# Patient Record
Sex: FEMALE | Race: ASIAN | NOT HISPANIC OR LATINO | URBAN - METROPOLITAN AREA
[De-identification: names, ages, dates, MRNs, and addresses within clinical notes are randomized per-mention and may not be internally consistent; named-entity substitution may affect disease eponyms.]

---

## 2019-10-29 ENCOUNTER — INPATIENT (INPATIENT)
Facility: HOSPITAL | Age: 64
LOS: 3 days | Discharge: HOME | End: 2019-11-02
Attending: HOSPITALIST | Admitting: HOSPITALIST
Payer: SELF-PAY

## 2019-10-29 VITALS
RESPIRATION RATE: 18 BRPM | SYSTOLIC BLOOD PRESSURE: 136 MMHG | OXYGEN SATURATION: 100 % | HEART RATE: 105 BPM | TEMPERATURE: 98 F | DIASTOLIC BLOOD PRESSURE: 78 MMHG

## 2019-10-29 RX ORDER — FAMOTIDINE 10 MG/ML
20 INJECTION INTRAVENOUS ONCE
Refills: 0 | Status: COMPLETED | OUTPATIENT
Start: 2019-10-29 | End: 2019-10-29

## 2019-10-29 RX ORDER — PANTOPRAZOLE SODIUM 20 MG/1
80 TABLET, DELAYED RELEASE ORAL ONCE
Refills: 0 | Status: COMPLETED | OUTPATIENT
Start: 2019-10-29 | End: 2019-10-29

## 2019-10-29 RX ORDER — SODIUM CHLORIDE 9 MG/ML
1000 INJECTION INTRAMUSCULAR; INTRAVENOUS; SUBCUTANEOUS ONCE
Refills: 0 | Status: COMPLETED | OUTPATIENT
Start: 2019-10-29 | End: 2019-10-29

## 2019-10-29 NOTE — ED ADULT NURSE NOTE - NSIMPLEMENTINTERV_GEN_ALL_ED
Implemented All Universal Safety Interventions:  [place] to call system. Call bell, personal items and telephone within reach. Instruct patient to call for assistance. Room bathroom lighting operational. Non-slip footwear when patient is off stretcher. Physically safe environment: no spills, clutter or unnecessary equipment. Stretcher in lowest position, wheels locked, appropriate side rails in place.

## 2019-10-29 NOTE — ED ADULT TRIAGE NOTE - CHIEF COMPLAINT QUOTE
bibems from home for nausea and vomiting x 2. EMS states blood was seen in emesis, coffee grounds with large clot.  pt denies anticoagulant use. denies any GI history. 20g iv placed in left ac by ems. bibems from home for nausea and vomiting x 2. also episode of "feeling hot". EMS states blood was seen in emesis, coffee grounds with large clot.  pt denies anticoagulant use. denies any GI history. 20g iv placed in left ac by ems. left bundle branch seen on ekg. pt states has no known cardiac history.

## 2019-10-29 NOTE — ED ADULT NURSE NOTE - CHIEF COMPLAINT QUOTE
bibems from home for nausea and vomiting x 2. also episode of "feeling hot". EMS states blood was seen in emesis, coffee grounds with large clot.  pt denies anticoagulant use. denies any GI history. 20g iv placed in left ac by ems. left bundle branch seen on ekg. pt states has no known cardiac history.

## 2019-10-29 NOTE — ED ADULT NURSE NOTE - OBJECTIVE STATEMENT
Melissa RN: 64yo female received in room C. Pt ambulatory, A&Ox4, family by the bedside. Pt c/o of two episodes of vomiting moderate amount of blood two hours ago, coffee ground in appearance. Pt denies blood thinner use. Pt denies abdominal pain, headache, chest pain, n/v/d, fever, chill, dizziness or weakness. 18G IV placed in right AC, flushed without difficulty and positive blood return. Pt placed on cardiac monitor. Will continue to monitor. Melissa RN: 62yo female received in room C. Pt ambulatory, A&Ox4, family by the bedside. Pt c/o of two episodes of vomiting moderate amount of blood two hours ago, coffee ground in appearance. Pt denies blood thinner use. Pt denies abdominal pain, headache, chest pain, n/v/d, fever, chill, dizziness or weakness at present. 18G IV placed in right AC, flushed without difficulty and positive blood return. Pt placed on cardiac monitor. Will continue to monitor.

## 2019-10-30 DIAGNOSIS — K92.2 GASTROINTESTINAL HEMORRHAGE, UNSPECIFIED: ICD-10-CM

## 2019-10-30 LAB
ALBUMIN SERPL ELPH-MCNC: 4.1 G/DL — SIGNIFICANT CHANGE UP (ref 3.3–5)
ALP SERPL-CCNC: 108 U/L — SIGNIFICANT CHANGE UP (ref 40–120)
ALT FLD-CCNC: 33 U/L — SIGNIFICANT CHANGE UP (ref 4–33)
ANION GAP SERPL CALC-SCNC: 20 MMO/L — HIGH (ref 7–14)
ANISOCYTOSIS BLD QL: SLIGHT — SIGNIFICANT CHANGE UP
APTT BLD: 30 SEC — SIGNIFICANT CHANGE UP (ref 27.5–36.3)
AST SERPL-CCNC: 28 U/L — SIGNIFICANT CHANGE UP (ref 4–32)
BASE EXCESS BLDV CALC-SCNC: -0.5 MMOL/L — SIGNIFICANT CHANGE UP
BASOPHILS # BLD AUTO: 0.01 K/UL — SIGNIFICANT CHANGE UP (ref 0–0.2)
BASOPHILS # BLD AUTO: 0.02 K/UL — SIGNIFICANT CHANGE UP (ref 0–0.2)
BASOPHILS # BLD AUTO: 0.04 K/UL — SIGNIFICANT CHANGE UP (ref 0–0.2)
BASOPHILS NFR BLD AUTO: 0.1 % — SIGNIFICANT CHANGE UP (ref 0–2)
BASOPHILS NFR BLD AUTO: 0.2 % — SIGNIFICANT CHANGE UP (ref 0–2)
BASOPHILS NFR BLD AUTO: 0.3 % — SIGNIFICANT CHANGE UP (ref 0–2)
BASOPHILS NFR SPEC: 0 % — SIGNIFICANT CHANGE UP (ref 0–2)
BILIRUB SERPL-MCNC: 0.2 MG/DL — SIGNIFICANT CHANGE UP (ref 0.2–1.2)
BLASTS # FLD: 0 % — SIGNIFICANT CHANGE UP (ref 0–0)
BLD GP AB SCN SERPL QL: NEGATIVE — SIGNIFICANT CHANGE UP
BLOOD GAS VENOUS - CREATININE: 0.81 MG/DL — SIGNIFICANT CHANGE UP (ref 0.5–1.3)
BLOOD GAS VENOUS - FIO2: 21 — SIGNIFICANT CHANGE UP
BUN SERPL-MCNC: 24 MG/DL — HIGH (ref 7–23)
CALCIUM SERPL-MCNC: 9.4 MG/DL — SIGNIFICANT CHANGE UP (ref 8.4–10.5)
CHLORIDE BLDV-SCNC: 105 MMOL/L — SIGNIFICANT CHANGE UP (ref 96–108)
CHLORIDE SERPL-SCNC: 98 MMOL/L — SIGNIFICANT CHANGE UP (ref 98–107)
CO2 SERPL-SCNC: 23 MMOL/L — SIGNIFICANT CHANGE UP (ref 22–31)
CREAT SERPL-MCNC: 0.97 MG/DL — SIGNIFICANT CHANGE UP (ref 0.5–1.3)
ELLIPTOCYTES BLD QL SMEAR: SLIGHT — SIGNIFICANT CHANGE UP
EOSINOPHIL # BLD AUTO: 0 K/UL — SIGNIFICANT CHANGE UP (ref 0–0.5)
EOSINOPHIL # BLD AUTO: 0.2 K/UL — SIGNIFICANT CHANGE UP (ref 0–0.5)
EOSINOPHIL # BLD AUTO: 0.22 K/UL — SIGNIFICANT CHANGE UP (ref 0–0.5)
EOSINOPHIL NFR BLD AUTO: 0 % — SIGNIFICANT CHANGE UP (ref 0–6)
EOSINOPHIL NFR BLD AUTO: 1.5 % — SIGNIFICANT CHANGE UP (ref 0–6)
EOSINOPHIL NFR BLD AUTO: 2 % — SIGNIFICANT CHANGE UP (ref 0–6)
EOSINOPHIL NFR FLD: 0 % — SIGNIFICANT CHANGE UP (ref 0–6)
GAS PNL BLDV: 139 MMOL/L — SIGNIFICANT CHANGE UP (ref 136–146)
GLUCOSE BLDC GLUCOMTR-MCNC: 182 MG/DL — HIGH (ref 70–99)
GLUCOSE BLDC GLUCOMTR-MCNC: 205 MG/DL — HIGH (ref 70–99)
GLUCOSE BLDC GLUCOMTR-MCNC: 225 MG/DL — HIGH (ref 70–99)
GLUCOSE BLDV-MCNC: 247 MG/DL — HIGH (ref 70–99)
GLUCOSE SERPL-MCNC: 241 MG/DL — HIGH (ref 70–99)
HBA1C BLD-MCNC: 10.8 % — HIGH (ref 4–5.6)
HCO3 BLDV-SCNC: 23 MMOL/L — SIGNIFICANT CHANGE UP (ref 20–27)
HCT VFR BLD CALC: 28.8 % — LOW (ref 34.5–45)
HCT VFR BLD CALC: 33.1 % — LOW (ref 34.5–45)
HCT VFR BLD CALC: 34.1 % — LOW (ref 34.5–45)
HCT VFR BLD CALC: 38.8 % — SIGNIFICANT CHANGE UP (ref 34.5–45)
HCT VFR BLDV CALC: 27.7 % — LOW (ref 34.5–45)
HGB BLD-MCNC: 10.5 G/DL — LOW (ref 11.5–15.5)
HGB BLD-MCNC: 10.7 G/DL — LOW (ref 11.5–15.5)
HGB BLD-MCNC: 11.4 G/DL — LOW (ref 11.5–15.5)
HGB BLD-MCNC: 8.8 G/DL — LOW (ref 11.5–15.5)
HGB BLDV-MCNC: 8.9 G/DL — LOW (ref 11.5–15.5)
IMM GRANULOCYTES NFR BLD AUTO: 0.3 % — SIGNIFICANT CHANGE UP (ref 0–1.5)
IMM GRANULOCYTES NFR BLD AUTO: 0.4 % — SIGNIFICANT CHANGE UP (ref 0–1.5)
IMM GRANULOCYTES NFR BLD AUTO: 0.5 % — SIGNIFICANT CHANGE UP (ref 0–1.5)
INR BLD: 0.93 — SIGNIFICANT CHANGE UP (ref 0.88–1.17)
LACTATE BLDV-MCNC: 4.4 MMOL/L — CRITICAL HIGH (ref 0.5–2)
LYMPHOCYTES # BLD AUTO: 0.7 K/UL — LOW (ref 1–3.3)
LYMPHOCYTES # BLD AUTO: 1.29 K/UL — SIGNIFICANT CHANGE UP (ref 1–3.3)
LYMPHOCYTES # BLD AUTO: 11.4 % — LOW (ref 13–44)
LYMPHOCYTES # BLD AUTO: 2.69 K/UL — SIGNIFICANT CHANGE UP (ref 1–3.3)
LYMPHOCYTES # BLD AUTO: 20 % — SIGNIFICANT CHANGE UP (ref 13–44)
LYMPHOCYTES # BLD AUTO: 5.7 % — LOW (ref 13–44)
LYMPHOCYTES NFR SPEC AUTO: 11.3 % — LOW (ref 13–44)
MAGNESIUM SERPL-MCNC: 1.6 MG/DL — SIGNIFICANT CHANGE UP (ref 1.6–2.6)
MCHC RBC-ENTMCNC: 23.3 PG — LOW (ref 27–34)
MCHC RBC-ENTMCNC: 24.6 PG — LOW (ref 27–34)
MCHC RBC-ENTMCNC: 25.1 PG — LOW (ref 27–34)
MCHC RBC-ENTMCNC: 29.4 % — LOW (ref 32–36)
MCHC RBC-ENTMCNC: 30.8 % — LOW (ref 32–36)
MCHC RBC-ENTMCNC: 32.3 % — SIGNIFICANT CHANGE UP (ref 32–36)
MCV RBC AUTO: 77.5 FL — LOW (ref 80–100)
MCV RBC AUTO: 79.3 FL — LOW (ref 80–100)
MCV RBC AUTO: 80 FL — SIGNIFICANT CHANGE UP (ref 80–100)
METAMYELOCYTES # FLD: 0 % — SIGNIFICANT CHANGE UP (ref 0–1)
MICROCYTES BLD QL: SLIGHT — SIGNIFICANT CHANGE UP
MONOCYTES # BLD AUTO: 0.53 K/UL — SIGNIFICANT CHANGE UP (ref 0–0.9)
MONOCYTES # BLD AUTO: 0.54 K/UL — SIGNIFICANT CHANGE UP (ref 0–0.9)
MONOCYTES # BLD AUTO: 0.81 K/UL — SIGNIFICANT CHANGE UP (ref 0–0.9)
MONOCYTES NFR BLD AUTO: 4.4 % — SIGNIFICANT CHANGE UP (ref 2–14)
MONOCYTES NFR BLD AUTO: 4.7 % — SIGNIFICANT CHANGE UP (ref 2–14)
MONOCYTES NFR BLD AUTO: 6 % — SIGNIFICANT CHANGE UP (ref 2–14)
MONOCYTES NFR BLD: 0.9 % — LOW (ref 2–9)
MYELOCYTES NFR BLD: 0 % — SIGNIFICANT CHANGE UP (ref 0–0)
NEUTROPHIL AB SER-ACNC: 80.9 % — HIGH (ref 43–77)
NEUTROPHILS # BLD AUTO: 11.06 K/UL — HIGH (ref 1.8–7.4)
NEUTROPHILS # BLD AUTO: 9.19 K/UL — HIGH (ref 1.8–7.4)
NEUTROPHILS # BLD AUTO: 9.68 K/UL — HIGH (ref 1.8–7.4)
NEUTROPHILS NFR BLD AUTO: 71.8 % — SIGNIFICANT CHANGE UP (ref 43–77)
NEUTROPHILS NFR BLD AUTO: 81.4 % — HIGH (ref 43–77)
NEUTROPHILS NFR BLD AUTO: 89.3 % — HIGH (ref 43–77)
NEUTS BAND # BLD: 6.9 % — HIGH (ref 0–6)
NRBC # FLD: 0 K/UL — SIGNIFICANT CHANGE UP (ref 0–0)
OTHER - HEMATOLOGY %: 0 — SIGNIFICANT CHANGE UP
PCO2 BLDV: 43 MMHG — SIGNIFICANT CHANGE UP (ref 41–51)
PH BLDV: 7.36 PH — SIGNIFICANT CHANGE UP (ref 7.32–7.43)
PHOSPHATE SERPL-MCNC: 3.6 MG/DL — SIGNIFICANT CHANGE UP (ref 2.5–4.5)
PLATELET # BLD AUTO: 197 K/UL — SIGNIFICANT CHANGE UP (ref 150–400)
PLATELET # BLD AUTO: 211 K/UL — SIGNIFICANT CHANGE UP (ref 150–400)
PLATELET # BLD AUTO: 301 K/UL — SIGNIFICANT CHANGE UP (ref 150–400)
PLATELET COUNT - ESTIMATE: NORMAL — SIGNIFICANT CHANGE UP
PMV BLD: 10.6 FL — SIGNIFICANT CHANGE UP (ref 7–13)
PMV BLD: 10.8 FL — SIGNIFICANT CHANGE UP (ref 7–13)
PMV BLD: 11 FL — SIGNIFICANT CHANGE UP (ref 7–13)
PO2 BLDV: 32 MMHG — LOW (ref 35–40)
POIKILOCYTOSIS BLD QL AUTO: SLIGHT — SIGNIFICANT CHANGE UP
POLYCHROMASIA BLD QL SMEAR: SIGNIFICANT CHANGE UP
POTASSIUM BLDV-SCNC: 3.9 MMOL/L — SIGNIFICANT CHANGE UP (ref 3.4–4.5)
POTASSIUM SERPL-MCNC: 4.3 MMOL/L — SIGNIFICANT CHANGE UP (ref 3.5–5.3)
POTASSIUM SERPL-SCNC: 4.3 MMOL/L — SIGNIFICANT CHANGE UP (ref 3.5–5.3)
PROMYELOCYTES # FLD: 0 % — SIGNIFICANT CHANGE UP (ref 0–0)
PROT SERPL-MCNC: 7.9 G/DL — SIGNIFICANT CHANGE UP (ref 6–8.3)
PROTHROM AB SERPL-ACNC: 10.3 SEC — SIGNIFICANT CHANGE UP (ref 9.8–13.1)
RBC # BLD: 4.26 M/UL — SIGNIFICANT CHANGE UP (ref 3.8–5.2)
RBC # BLD: 4.27 M/UL — SIGNIFICANT CHANGE UP (ref 3.8–5.2)
RBC # BLD: 4.89 M/UL — SIGNIFICANT CHANGE UP (ref 3.8–5.2)
RBC # FLD: 14.6 % — HIGH (ref 10.3–14.5)
RBC # FLD: 14.8 % — HIGH (ref 10.3–14.5)
RBC # FLD: 15 % — HIGH (ref 10.3–14.5)
RH IG SCN BLD-IMP: POSITIVE — SIGNIFICANT CHANGE UP
RH IG SCN BLD-IMP: POSITIVE — SIGNIFICANT CHANGE UP
SAO2 % BLDV: 53.7 % — LOW (ref 60–85)
SODIUM SERPL-SCNC: 141 MMOL/L — SIGNIFICANT CHANGE UP (ref 135–145)
VARIANT LYMPHS # BLD: 0 % — SIGNIFICANT CHANGE UP
WBC # BLD: 11.28 K/UL — HIGH (ref 3.8–10.5)
WBC # BLD: 12.37 K/UL — HIGH (ref 3.8–10.5)
WBC # BLD: 13.47 K/UL — HIGH (ref 3.8–10.5)
WBC # FLD AUTO: 11.28 K/UL — HIGH (ref 3.8–10.5)
WBC # FLD AUTO: 12.37 K/UL — HIGH (ref 3.8–10.5)
WBC # FLD AUTO: 13.47 K/UL — HIGH (ref 3.8–10.5)

## 2019-10-30 PROCEDURE — 99222 1ST HOSP IP/OBS MODERATE 55: CPT | Mod: GC,25

## 2019-10-30 PROCEDURE — 71045 X-RAY EXAM CHEST 1 VIEW: CPT | Mod: 26

## 2019-10-30 PROCEDURE — 99291 CRITICAL CARE FIRST HOUR: CPT

## 2019-10-30 PROCEDURE — 43255 EGD CONTROL BLEEDING ANY: CPT | Mod: GC

## 2019-10-30 PROCEDURE — 93308 TTE F-UP OR LMTD: CPT | Mod: 26,GC

## 2019-10-30 PROCEDURE — 76604 US EXAM CHEST: CPT | Mod: 26,GC

## 2019-10-30 PROCEDURE — 99233 SBSQ HOSP IP/OBS HIGH 50: CPT | Mod: GC,25

## 2019-10-30 RX ORDER — ONDANSETRON 8 MG/1
4 TABLET, FILM COATED ORAL ONCE
Refills: 0 | Status: COMPLETED | OUTPATIENT
Start: 2019-10-30 | End: 2019-10-30

## 2019-10-30 RX ORDER — PANTOPRAZOLE SODIUM 20 MG/1
8 TABLET, DELAYED RELEASE ORAL
Qty: 80 | Refills: 0 | Status: DISCONTINUED | OUTPATIENT
Start: 2019-10-30 | End: 2019-11-01

## 2019-10-30 RX ORDER — CHLORHEXIDINE GLUCONATE 213 G/1000ML
1 SOLUTION TOPICAL
Refills: 0 | Status: DISCONTINUED | OUTPATIENT
Start: 2019-10-30 | End: 2019-10-30

## 2019-10-30 RX ORDER — FENTANYL CITRATE 50 UG/ML
100 INJECTION INTRAVENOUS ONCE
Refills: 0 | Status: DISCONTINUED | OUTPATIENT
Start: 2019-10-30 | End: 2019-10-30

## 2019-10-30 RX ORDER — DEXTROSE 50 % IN WATER 50 %
12.5 SYRINGE (ML) INTRAVENOUS ONCE
Refills: 0 | Status: DISCONTINUED | OUTPATIENT
Start: 2019-10-30 | End: 2019-11-02

## 2019-10-30 RX ORDER — MIDAZOLAM HYDROCHLORIDE 1 MG/ML
4 INJECTION, SOLUTION INTRAMUSCULAR; INTRAVENOUS ONCE
Refills: 0 | Status: DISCONTINUED | OUTPATIENT
Start: 2019-10-30 | End: 2019-10-30

## 2019-10-30 RX ORDER — INSULIN LISPRO 100/ML
VIAL (ML) SUBCUTANEOUS EVERY 6 HOURS
Refills: 0 | Status: DISCONTINUED | OUTPATIENT
Start: 2019-10-30 | End: 2019-10-31

## 2019-10-30 RX ORDER — GLUCAGON INJECTION, SOLUTION 0.5 MG/.1ML
1 INJECTION, SOLUTION SUBCUTANEOUS ONCE
Refills: 0 | Status: DISCONTINUED | OUTPATIENT
Start: 2019-10-30 | End: 2019-11-02

## 2019-10-30 RX ORDER — SODIUM CHLORIDE 9 MG/ML
1000 INJECTION, SOLUTION INTRAVENOUS
Refills: 0 | Status: DISCONTINUED | OUTPATIENT
Start: 2019-10-30 | End: 2019-11-02

## 2019-10-30 RX ORDER — DESMOPRESSIN ACETATE 0.1 MG/1
0.1 TABLET ORAL ONCE
Refills: 0 | Status: COMPLETED | OUTPATIENT
Start: 2019-10-30 | End: 2019-10-30

## 2019-10-30 RX ORDER — DEXTROSE 50 % IN WATER 50 %
15 SYRINGE (ML) INTRAVENOUS ONCE
Refills: 0 | Status: DISCONTINUED | OUTPATIENT
Start: 2019-10-30 | End: 2019-11-02

## 2019-10-30 RX ORDER — ONDANSETRON 8 MG/1
4 TABLET, FILM COATED ORAL EVERY 6 HOURS
Refills: 0 | Status: DISCONTINUED | OUTPATIENT
Start: 2019-10-30 | End: 2019-11-02

## 2019-10-30 RX ORDER — DEXTROSE 50 % IN WATER 50 %
25 SYRINGE (ML) INTRAVENOUS ONCE
Refills: 0 | Status: DISCONTINUED | OUTPATIENT
Start: 2019-10-30 | End: 2019-11-02

## 2019-10-30 RX ADMIN — Medication 2: at 11:15

## 2019-10-30 RX ADMIN — SODIUM CHLORIDE 2000 MILLILITER(S): 9 INJECTION INTRAMUSCULAR; INTRAVENOUS; SUBCUTANEOUS at 00:07

## 2019-10-30 RX ADMIN — SODIUM CHLORIDE 75 MILLILITER(S): 9 INJECTION, SOLUTION INTRAVENOUS at 09:33

## 2019-10-30 RX ADMIN — ONDANSETRON 4 MILLIGRAM(S): 8 TABLET, FILM COATED ORAL at 00:08

## 2019-10-30 RX ADMIN — PANTOPRAZOLE SODIUM 80 MILLIGRAM(S): 20 TABLET, DELAYED RELEASE ORAL at 00:08

## 2019-10-30 RX ADMIN — FENTANYL CITRATE 100 MICROGRAM(S): 50 INJECTION INTRAVENOUS at 10:28

## 2019-10-30 RX ADMIN — PANTOPRAZOLE SODIUM 10 MG/HR: 20 TABLET, DELAYED RELEASE ORAL at 00:56

## 2019-10-30 RX ADMIN — ONDANSETRON 4 MILLIGRAM(S): 8 TABLET, FILM COATED ORAL at 03:10

## 2019-10-30 RX ADMIN — SODIUM CHLORIDE 75 MILLILITER(S): 9 INJECTION, SOLUTION INTRAVENOUS at 23:08

## 2019-10-30 RX ADMIN — PANTOPRAZOLE SODIUM 10 MG/HR: 20 TABLET, DELAYED RELEASE ORAL at 09:33

## 2019-10-30 RX ADMIN — FAMOTIDINE 20 MILLIGRAM(S): 10 INJECTION INTRAVENOUS at 00:08

## 2019-10-30 RX ADMIN — Medication 30 MILLILITER(S): at 00:07

## 2019-10-30 RX ADMIN — MIDAZOLAM HYDROCHLORIDE 4 MILLIGRAM(S): 1 INJECTION, SOLUTION INTRAMUSCULAR; INTRAVENOUS at 10:30

## 2019-10-30 RX ADMIN — Medication 1: at 23:07

## 2019-10-30 RX ADMIN — PANTOPRAZOLE SODIUM 10 MG/HR: 20 TABLET, DELAYED RELEASE ORAL at 23:07

## 2019-10-30 RX ADMIN — Medication 2: at 17:47

## 2019-10-30 RX ADMIN — CHLORHEXIDINE GLUCONATE 1 APPLICATION(S): 213 SOLUTION TOPICAL at 11:15

## 2019-10-30 RX ADMIN — ONDANSETRON 4 MILLIGRAM(S): 8 TABLET, FILM COATED ORAL at 00:41

## 2019-10-30 RX ADMIN — FENTANYL CITRATE 100 MICROGRAM(S): 50 INJECTION INTRAVENOUS at 10:43

## 2019-10-30 NOTE — H&P ADULT - ASSESSMENT
#Neuro    #Resp    #CV    #GI    #ID    #Renal    #Heme    #Endo    #DVT PPx #Neuro  -A&Ox3  -No active issues    #Resp  -No active issues    #CV  -Normotensive, not on any pressors    #GI  -Unclear etiology of upper GI bleed  -Abdomen nontender  -Started on protonix drip  -GI consulted, appreciate recs    #ID  -No active issues    #Renal  -Strict intake/output  -Creatinine 0.97, will continue to trend    #Heme  -Hb 8.8 <- 11.4  -Patient s/p 1 unit pRBC and 1 unit of platelets  -Holding home aspirin while active bleeding  -No easy bruising, vaginal bleeding, or other signs of coagulopathy    #Endo  -Hold home metformin   -Q6H finger sticks with ISS    #DVT PPx  -SCDs 63 year old female with PMH DM, HTN, HLD presented with hematemesis x 1 day.    #Neuro  -A&Ox3  -No active issues    #Resp  -No active issues    #CV  -Normotensive, not on any pressors    #GI  -Unclear etiology of upper GI bleed  -Abdomen nontender  -Started on protonix drip  -GI consulted, appreciate recs    #ID  -No active issues    #Renal  -Strict intake/output  -Creatinine 0.97, will continue to trend    #Heme  -Hb 8.8 <- 11.4  -Patient s/p 1 unit pRBC and 1 unit of platelets  -Holding home aspirin while active bleeding  -No easy bruising, vaginal bleeding, or other signs of coagulopathy    #Endo  -Hold home metformin   -Q6H finger sticks with ISS    #DVT PPx  -SCDs

## 2019-10-30 NOTE — CHART NOTE - NSCHARTNOTEFT_GEN_A_CORE
MICU Transfer Note    Transfer from: MICU  Transfer to:  (X) Medicine    (  ) Telemetry    (  ) RCU    (  ) Palliative    (  ) Stroke Unit    (  ) _______________  Accepting physican: Dr. Nix      MICU COURSE:  63 year old female with PMH DM, HTN, HLD presented with hematemesis x 1 day.  Pt states she started feeling nauseous approximately 2 days ago and had approximately 5 episodes of bright red bloody emesis yesterday.  Denies any history of prior episodes.  Denies any EtOH use.  Last BM yesterday, normal as per pt, denies any bloody stools or black tarry stools. Denies any abdominal pain, diarrhea, chest pain, shortness of breath, dizziness, lightheadedness, syncope, fevers, or rash.  Denies any pain with PO intake.  Denies any recent injury or trauma. Denies any personal or family history of any bleeding disorders.  On admission found to have Hgb drop 11.4->8.8. Pt given 1 unit pRBC and 1 unit of platelets w/ good response. Hgb 10.7. Admitted for UGIB likely 2/2 tory hassan tear vs PUD vs esophagitis. GI consulted. Pt s/p endoscopy.     Pt seen and examined at bedside. Reports that she is feeling weak, denies bleeding. Had BM this AM - unsure if melena or hematochezia. Denies vomiting, F/C, CP, SOB, N/V, swelling.      Vital Signs Last 24 Hrs  T(C): 36.6 (30 Oct 2019 12:00), Max: 37.8 (30 Oct 2019 05:24)  T(F): 97.8 (30 Oct 2019 12:00), Max: 100 (30 Oct 2019 05:24)  HR: 69 (30 Oct 2019 12:00) (69 - 105)  BP: 105/65 (30 Oct 2019 12:00) (105/65 - 174/98)  BP(mean): 77 (30 Oct 2019 12:00) (77 - 119)  RR: 16 (30 Oct 2019 12:00) (13 - 26)  SpO2: 100% (30 Oct 2019 12:00) (95% - 100%)  I&O's Summary    30 Oct 2019 07:01  -  30 Oct 2019 14:18  --------------------------------------------------------  IN: 190 mL / OUT: 400 mL / NET: -210 mL      MEDICATIONS  (STANDING):  dextrose 5%. 1000 milliLiter(s) (50 mL/Hr) IV Continuous <Continuous>  dextrose 50% Injectable 12.5 Gram(s) IV Push once  dextrose 50% Injectable 25 Gram(s) IV Push once  dextrose 50% Injectable 25 Gram(s) IV Push once  insulin lispro (HumaLOG) corrective regimen sliding scale   SubCutaneous every 6 hours  lactated ringers. 1000 milliLiter(s) (75 mL/Hr) IV Continuous <Continuous>  pantoprazole Infusion 8 mG/Hr (10 mL/Hr) IV Continuous <Continuous>    MEDICATIONS  (PRN):  dextrose 40% Gel 15 Gram(s) Oral once PRN Blood Glucose LESS THAN 70 milliGRAM(s)/deciliter  glucagon  Injectable 1 milliGRAM(s) IntraMuscular once PRN Glucose LESS THAN 70 milligrams/deciliter  ondansetron Injectable 4 milliGRAM(s) IV Push every 6 hours PRN Nausea and/or Vomiting    On physical exam:  Gen: NAD [place]  female pt  HEENT: +conjunctival pallor B/L +mucosal pallor  Chest/CV: RRR, +2 peripheral pulses  Pulm: CTAB/L  Abd: soft, NT, ND +BS  MSK: no peripheral edema/cyanosis       LABS                                            10.5                  Neurophils% (auto):   81.4   (10-30 @ 11:00):    11.28)-----------(211          Lymphocytes% (auto):  11.4                                          34.1                   Eosinphils% (auto):   2.0      Manual%: Neutrophils 80.9 ; Lymphocytes 11.3 ; Eosinophils 0.0  ; Bands%: 6.9  ; Blasts 0                                    141    |  98     |  24                  Calcium: 9.4   / iCa: x      (10-29 @ 23:43)    ----------------------------<  241       Magnesium: 1.6                              4.3     |  23     |  0.97             Phosphorous: 3.6      TPro  7.9    /  Alb  4.1    /  TBili  0.2    /  DBili  x      /  AST  28     /  ALT  33     /  AlkPhos  108    29 Oct 2019 23:43    ( 10-29 @ 23:43 )   PT: 10.3 SEC;   INR: 0.93   aPTT: 30.0 SEC      A/P: 64 yo female with hx of DM, HTN, HLD who presents with hematemesis admitted for UGIB likely 2/2 PUD vs esophagitis vs tory hassan tear    Follow up:  F/U GI recs  Monitor H/H  F/u endoscopy results  Keep NPO for now - advance per Gi      BERNARDO Felipe, PGY-3

## 2019-10-30 NOTE — H&P ADULT - NSHPREVIEWOFSYSTEMS_GEN_ALL_CORE
Constitutional: No Fever, Fatigue, Weight Changes  Eyes: No recent vision problems or eye pain.  ENT: No congestion, ear pain, or sore throat.  Endocrine: No excess sweating, temperature intolerance  Cardiovascular: No chest pain, palpitations, shortness of breath, pre-syncope, syncope  Respiratory: No cough, congestion, or wheezing.  Gastrointestinal: abdominal pain, nausea, vomiting  Genitourinary: No dysuria, hematuria  Musculoskeletal: No joint swelling, joint pain  Neurologic: No headache, dizziness, focal weakness  Skin: No rashes, hematoma, prupura

## 2019-10-30 NOTE — CHART NOTE - NSCHARTNOTEFT_GEN_A_CORE
: Karie Del Toro    INDICATION: Shock    PROCEDURE:   LIMITED ECHO   LIMITED CHEST    FINDINGS:  Left ventricle: normal size and function   Right ventricle: normal size and function   Left atrium: dilated   Right atrium: dilated   IVC: normal size    Lungs: predominant A-lines bilaterally   Pleural sliding: normal   Pleural Effusion: no  Consolidation: no    INTERPRETATION:  Normal cardiac function  Normal lung aeration pattern    Images uploaded on [employer]

## 2019-10-30 NOTE — CHART NOTE - NSCHARTNOTEFT_GEN_A_CORE
MICU Transfer Note    Transfer from: MICU  Transfer to:  (  ) Medicine    (  ) Telemetry    (  ) RCU    (  ) Palliative    (  ) Stroke Unit    (  ) _______________  Accepting physican:      MICU COURSE:      63 year old female with PMH DM, HTN, HLD presented with hematemesis x 1 day.  Pt states she started feeling nauseous approximately 2 days ago and had approximately 5 episodes of bright red bloody emesis yesterday.  Denies any history of prior episodes.  Denies any EtOH use.  Last BM yesterday, normal as per pt, denies any bloody stools or black tarry stools. Denies any abdominal pain, diarrhea, chest pain, shortness of breath, dizziness, lightheadedness, syncope, fevers, or rash.  Denies any pain with PO intake.  Denies any recent injury or trauma. Denies any personal or family history of any bleeding disorders.  On admission found to have Hgb drop 11.4->8.8. Pt given 1 unit pRBC and 1 unit of platelets w/ good response. Hgb 10.7. Admitted for UGIB likely 2/2 tory hassan tear vs PUD vs esophagitis. GI consulted. Pt s/p endoscopy.                 Vital Signs Last 24 Hrs  T(C): 36.6 (30 Oct 2019 12:00), Max: 37.8 (30 Oct 2019 05:24)  T(F): 97.8 (30 Oct 2019 12:00), Max: 100 (30 Oct 2019 05:24)  HR: 69 (30 Oct 2019 12:00) (69 - 105)  BP: 105/65 (30 Oct 2019 12:00) (105/65 - 174/98)  BP(mean): 77 (30 Oct 2019 12:00) (77 - 119)  RR: 16 (30 Oct 2019 12:00) (13 - 26)  SpO2: 100% (30 Oct 2019 12:00) (95% - 100%)  I&O's Summary    30 Oct 2019 07:01  -  30 Oct 2019 13:45  --------------------------------------------------------  IN: 190 mL / OUT: 400 mL / NET: -210 mL          MEDICATIONS  (STANDING):  dextrose 5%. 1000 milliLiter(s) (50 mL/Hr) IV Continuous <Continuous>  dextrose 50% Injectable 12.5 Gram(s) IV Push once  dextrose 50% Injectable 25 Gram(s) IV Push once  dextrose 50% Injectable 25 Gram(s) IV Push once  insulin lispro (HumaLOG) corrective regimen sliding scale   SubCutaneous every 6 hours  lactated ringers. 1000 milliLiter(s) (75 mL/Hr) IV Continuous <Continuous>  pantoprazole Infusion 8 mG/Hr (10 mL/Hr) IV Continuous <Continuous>    MEDICATIONS  (PRN):  dextrose 40% Gel 15 Gram(s) Oral once PRN Blood Glucose LESS THAN 70 milliGRAM(s)/deciliter  glucagon  Injectable 1 milliGRAM(s) IntraMuscular once PRN Glucose LESS THAN 70 milligrams/deciliter  ondansetron Injectable 4 milliGRAM(s) IV Push every 6 hours PRN Nausea and/or Vomiting        LABS                                            10.5                  Neurophils% (auto):   81.4   (10-30 @ 11:00):    11.28)-----------(211          Lymphocytes% (auto):  11.4                                          34.1                   Eosinphils% (auto):   2.0      Manual%: Neutrophils 80.9 ; Lymphocytes 11.3 ; Eosinophils 0.0  ; Bands%: 6.9  ; Blasts 0                                    141    |  98     |  24                  Calcium: 9.4   / iCa: x      (10-29 @ 23:43)    ----------------------------<  241       Magnesium: 1.6                              4.3     |  23     |  0.97             Phosphorous: 3.6      TPro  7.9    /  Alb  4.1    /  TBili  0.2    /  DBili  x      /  AST  28     /  ALT  33     /  AlkPhos  108    29 Oct 2019 23:43    ( 10-29 @ 23:43 )   PT: 10.3 SEC;   INR: 0.93   aPTT: 30.0 SEC      ASSESSMENT & PLAN:      62 yo female with hx of DM, HTN, HLD who presents with hematemesis admitted for UGIB likely 2/2 PUD vs esophagitis vs tory hassan tear    #Neuro  -A&Ox3  -No active issues    #Resp  -No active issues  -Saturating well on RA    #CV  -Normotensive, not on any pressors    #GI  -UGIB likely 2/2 PUD vs gastritis vs vasc lesion.  -c/w protonix drip  -GI consulted, s/p endoscopy  -F/u results    DIET  -NPO for now    #ID  -No active issues    #Renal  -sCr wnl  -No active issues     #Heme  Anemia  -Hb 8.8 <- 11.4 on admission. Given 1 unit pRBC and 1 unit of platelets w/ good response. Hgb 10.7  -Holding home aspirin while actively bleeding  -Monitor H/H   -Maintain active type and screen     #Endo  DM  -Holding home metformin   -A1C 10.8  -Q6H finger sticks with ISS when eating       #DVT PPx  -SCDs      Follow ups    F/U GI recs  Monitor H/H  F/u endoscopy results  Keep NPO

## 2019-10-30 NOTE — ED PROVIDER NOTE - OBJECTIVE STATEMENT
62 y/o F w/ PMH of HTN, DM, HLD presenting w/ a complaint of vomiting blood. Pt presents with family. Reports earlier today had 2 episodes of bloody vomit. Had felt well earlier in the day, but after she woke up from a nap she developed nausea and the vomiting. Had felt well prior to symptoms starting. Per EMS report there was coffee ground appearance to emesis. Daughter reports large pieces of blood clots. Pt denies bloody or black colored stools. On daily ASA 81 mg, but denies other daily NSAID use. Currently still nauseous and complaining of abdominal soreness. Denies fevers, chills, headache, dizziness, chest pain, cough, shortness of breath, abdominal pain, diarrhea, constipation, urinary symptoms, MSK pain.

## 2019-10-30 NOTE — ED ADULT NURSE REASSESSMENT NOTE - NS ED NURSE REASSESS COMMENT FT1
Pt had large emesis episode, bloody. Dr. Barkley at bedside, is aware. Protonix infusion and platelet infusion ordered at this time. BP stable 135/64. Pt only endorses belching and abd burning. Will continue to monitor.

## 2019-10-30 NOTE — PROGRESS NOTE ADULT - ASSESSMENT
62 yo female with hx of DM, HTN, HLD who presents with hematemesis admitted for UGIB likely 2/2 PUD vs gastritis vs vasc lesion.    #Neuro  -A&Ox3  -No active issues    #Resp  -No active issues  -Saturating well on RA    #CV  -Normotensive, not on any pressors    #GI  -UGIB likely 2/2 PUD vs gastritis vs vasc lesion.  -c/w protonix drip  -GI consulted, appreciate recs    DIET  -NPO for now    #ID  -No active issues    #Renal  -sCr wnl  -No active issues     #Heme  Anemia  -Hb 8.8 <- 11.4 on admission. Given 1 unit pRBC and 1 unit of platelets w/ good response. Hgb 10.7  -Holding home aspirin while actively bleeding  -Monitor H/H   -Maintain active type and screen     #Endo  DM  -Holding home metformin   -A1C 10.8  -Q6H finger sticks with ISS when eating       #DVT PPx  -SCDs

## 2019-10-30 NOTE — H&P ADULT - NSHPPHYSICALEXAM_GEN_ALL_CORE
VITAL SIGNS (Last 24 hrs):  T(C): 37.1 (10-30-19 @ 02:50), Max: 37.1 (10-30-19 @ 02:50)  HR: 93 (10-30-19 @ 02:50) (91 - 105)  BP: 118/64 (10-30-19 @ 02:50) (115/66 - 136/78)  RR: 20 (10-30-19 @ 02:50) (18 - 20)  SpO2: 99% (10-30-19 @ 02:50) (95% - 100%)  Wt(kg): --  Daily     Daily     I&O's Summary VITAL SIGNS (Last 24 hrs):  T(C): 37.1 (10-30-19 @ 02:50), Max: 37.1 (10-30-19 @ 02:50)  HR: 93 (10-30-19 @ 02:50) (91 - 105)  BP: 118/64 (10-30-19 @ 02:50) (115/66 - 136/78)  RR: 20 (10-30-19 @ 02:50) (18 - 20)  SpO2: 99% (10-30-19 @ 02:50) (95% - 100%)    CONSTITUTIONAL: non-toxic, well appearing  SKIN: no rash, no petechiae.  EYES: PERRL, EOMI, pink conjunctiva, anicteric  ENT: tongue and uvular midline, no exudates, moist mucous membranes  NECK: Supple; no meningismus, no JVD  CARD: RRR, no murmurs, equal radial pulses bilaterally 2+  RESP: CTAB, no respiratory distress  ABD: Soft, non-tender, non-distended, no peritoneal signs, no CVA tenderness  EXT: Normal ROM x4. No edema. No calf tenderness  NEURO: Alert, oriented. Neuro exam nonfocal, equal strength bilaterally  PSYCH: Cooperative, appropriate.

## 2019-10-30 NOTE — H&P ADULT - NSHPLABSRESULTS_GEN_ALL_CORE
8.8    x     )-----------( x        ( 30 Oct 2019 02:50 )             28.8     Hgb Trend: 8.8<--, 11.4<--  10-29    141  |  98  |  24<H>  ----------------------------<  241<H>  4.3   |  23  |  0.97    Ca    9.4      29 Oct 2019 23:43    TPro  7.9  /  Alb  4.1  /  TBili  0.2  /  DBili  x   /  AST  28  /  ALT  33  /  AlkPhos  108  10-29    Creatinine Trend: 0.97<--  PT/INR - ( 29 Oct 2019 23:43 )   PT: 10.3 SEC;   INR: 0.93          PTT - ( 29 Oct 2019 23:43 )  PTT:30.0 SEC

## 2019-10-30 NOTE — ED ADULT NURSE REASSESSMENT NOTE - NS ED NURSE REASSESS COMMENT FT1
Break RN: Pt actively vomiting blood on floor, MD Barkley at bedside. 4mg of zofran IV given per MD Barkley.

## 2019-10-30 NOTE — ED PROVIDER NOTE - CLINICAL SUMMARY MEDICAL DECISION MAKING FREE TEXT BOX
62 y/o F w/ PMH of HTN, DM, HLD presenting w/ bloody emesis. Concern for GI bleeding given hx and symptoms. Unclear etiology at this time. Pt reports normal colonoscopy 5 years ago, but has not had endoscopy. Will check labs. Protonix. Pepcid. Zofran. IVF. Reassess. Likely will require admission for further workup.

## 2019-10-30 NOTE — ED PROVIDER NOTE - PROGRESS NOTE DETAILS
Pt vomited 1/3 emesis bag of dark red blood. HD stable. Protecting airway. Pt consented for platelets to reverse ASA. NGT placed. CHUCK. Patient vomited coffee ground emesis again. pRBCs ordered. Platelets have been given. KULWINDER

## 2019-10-30 NOTE — ED ADULT NURSE REASSESSMENT NOTE - NS ED NURSE REASSESS COMMENT FT1
pt had another large amount of bloody emesis episode. 1 unit PRBC ordered. VS as noted. NSR on cardiac monitor. resp even and unlabored. awaiting MICU bed assignment. will continue to monitor.

## 2019-10-30 NOTE — ED PROVIDER NOTE - ATTENDING CONTRIBUTION TO CARE
63F presents after two episodes of hematemesis described as coffee ground with large red clots. On arrival, pt with epigastric pain, HDS, pending labs, PPI, reassess. Admit for endocscopy for presumed UGIB. Signed out to Dr. Barkley prior to lab results.

## 2019-10-30 NOTE — H&P ADULT - HISTORY OF PRESENT ILLNESS
64 yo woman pmhx of T2DM, HTN, HLD BIBEMS for hematemesis. Patient felt nausea and vomited blood around 8pm. Previously she was in good health at baseline. She denied any previous episodes, hx of etoh use. Recently returned from a trip to [facility].     Meds: Asa 81mg, Metformin 63 year old female with PMH DM, HTN, HLD presented with hematemesis x 1 day.  Pt states she started feeling nauseous approximately 2 days ago and had approximately 5 episodes of bright red bloody emesis yesterday.  Denies any history of prior episodes.  Denies any EtOH use.  Last BM yesterday, normal as per pt, denies any bloody stools or black tarry stools. Denies any abdominal pain, diarrhea, chest pain, shortness of breath, dizziness, lightheadedness, syncope, fevers, or rash.  Denies any pain with PO intake.  Denies any recent injury or trauma. Denies any personal or family history of any bleeding disorders.  Denies any additional complaints.

## 2019-10-30 NOTE — CONSULT NOTE ADULT - ASSESSMENT
Impression:  # Hematemesisi: Ddx: PUD, erosive gastritis, esophagitis, dieulafoy lesion, angioectasia, stomach cancer. Hgb downtrended, vitals now improved after resuscitation  # T2DM  # Hypertension  # hyperlipidemia    Recommendation:  - continue with pantoprazole IV  - trend CBC, CMP, INR  - 2 large bore IVs; active type and screen  - transfuse Hgb > 7, Platelets > 50  - plan for upper endoscopy  - please keep NPO  - supportive care as per primary team

## 2019-10-30 NOTE — PROGRESS NOTE ADULT - ATTENDING COMMENTS
1.Gi bleed due to esophogeal tear seen on EGD. Pt received only 1 unit PRBC . H/H stable. Hemodynamically stable. Monitor H/H

## 2019-10-30 NOTE — ED PROVIDER NOTE - PHYSICAL EXAMINATION
Gen: NAD, AOx3, able to make needs known, non-toxic  Head: NCAT  HEENT: EOMI, oral mucosa moist, normal conjunctiva  Lung: CTAB, no respiratory distress, no wheezes/rhonchi/rales B/L, speaking in full sentences  CV: RRR, no murmurs  Abd: soft, ND, mild epigastric tenderness, no guarding, no CVA tenderness  MSK: no visible deformities  Neuro: No focal sensory or motor deficits  Skin: Warm, well perfused  Psych: normal affect

## 2019-10-30 NOTE — PROGRESS NOTE ADULT - SUBJECTIVE AND OBJECTIVE BOX
Beata Monique MD  PGY 1 Department of Internal Medicine  Pager: [phone]    Patient is a 63y old  Female who presents with a chief complaint of     SUBJECTIVE / OVERNIGHT EVENTS: Pt seen and examined. No acute overnight events.        MEDICATIONS  (STANDING):  chlorhexidine 4% Liquid 1 Application(s) Topical <User Schedule>  pantoprazole Infusion 8 mG/Hr (10 mL/Hr) IV Continuous <Continuous>    MEDICATIONS  (PRN):      I&O's Summary      Vital Signs Last 24 Hrs  T(C): 36.8 (30 Oct 2019 05:24), Max: 37.8 (30 Oct 2019 05:24)  T(F): 100 (30 Oct 2019 05:24), Max: 100 (30 Oct 2019 05:24)  HR: 93 (30 Oct 2019 07:00) (90 - 105)  BP: 142/79 (30 Oct 2019 07:00) (115/66 - 142/79)  BP(mean): 97 (30 Oct 2019 07:00) (94 - 97)  RR: 26 (30 Oct 2019 07:00) (18 - 26)  SpO2: 99% (30 Oct 2019 07:00) (95% - 100%)    CAPILLARY BLOOD GLUCOSE      POCT Blood Glucose.: 175 mg/dL (29 Oct 2019 23:14)                  PHYSICAL EXAM:              CONSTITUTIONAL: non-toxic, well appearing  	SKIN: no rash, no petechiae.  	EYES: PERRL, EOMI, pink conjunctiva, anicteric  	ENT: tongue and uvular midline, no exudates, moist mucous membranes  	NECK: Supple; no meningismus, no JVD  	CARD: RRR, no murmurs, equal radial pulses bilaterally 2+  	RESP: CTAB, no respiratory distress  	ABD: Soft, non-tender, non-distended, no peritoneal signs, no CVA tenderness  	EXT: Normal ROM x4. No edema. No calf tenderness  	NEURO: Alert, oriented. Neuro exam nonfocal, equal strength bilaterally              PSYCH: Cooperative, appropriate.    LABS:                        10.7   12.37 )-----------( 197      ( 30 Oct 2019 07:00 )             33.1     Auto Eosinophil # 0.00  / Auto Eosinophil % 0.0   / Auto Neutrophil # 11.06 / Auto Neutrophil % 89.3  / BANDS % x                            8.8    x     )-----------( x        ( 30 Oct 2019 02:50 )             28.8     Auto Eosinophil # x     / Auto Eosinophil % x     / Auto Neutrophil # x     / Auto Neutrophil % x     / BANDS % x                            11.4   13.47 )-----------( 301      ( 29 Oct 2019 23:43 )             38.8     Auto Eosinophil # 0.20  / Auto Eosinophil % 1.5   / Auto Neutrophil # 9.68  / Auto Neutrophil % 71.8  / BANDS % x        10-29    141  |  98  |  24<H>  ----------------------------<  241<H>  4.3   |  23  |  0.97    Ca    9.4      29 Oct 2019 23:43  Mg     1.6     10-29  Phos  3.6     10-29  TPro  7.9  /  Alb  4.1  /  TBili  0.2  /  DBili  x   /  AST  28  /  ALT  33  /  AlkPhos  108  10-29    PT/INR - ( 29 Oct 2019 23:43 )   PT: 10.3 SEC;   INR: 0.93          PTT - ( 29 Oct 2019 23:43 )  PTT:30.0 SEC              RADIOLOGY & ADDITIONAL TESTS:    Imaging Personally Reviewed:    Consultant(s) Notes Reviewed:      Care Discussed with Consultants/Other Providers:

## 2019-10-30 NOTE — H&P ADULT - ATTENDING COMMENTS
pt seen and examined at bedside as above, Pt is a 64 yo female with hx of dm, htn,  hld, who presents with hematemesis, pt in usoh until few hours pta, had several episodes  of bright red blood emesis, pt on asa for dm,  had hypotension sbp in the 90's.  PE bp 110/70  p103 rr 18 lungs no rhonchi heart s1s2 abdomen distended ext no edema, labs noted hgb 11.6  to 8.8 .  cxr noted,    -ugi bleed etiology pud vs gastritis, vasc lesion  -serial cbc, tx plts, prbcs, give ddavp  -monitor closely in icu  -gi evaluation for endoscopy  -iv protonix  -fs coverage  -critically ill with ugi bleed and hypotension

## 2019-10-30 NOTE — CONSULT NOTE ADULT - SUBJECTIVE AND OBJECTIVE BOX
Chief Complaint:  Patient is a 63y old  Female who presents with a chief complaint of Hematemesis (30 Oct 2019 04:00)      HPI: Pt is a 62 yo female with PMH T2DM, HTN, HLD presented with hematemesis x 1 day. States she had 4 episodes day prior and then 2-3 more episodes overnight. She states initially vomit was clear liquid then became bloody. States it was fank red blood. states prior to vomit was having nausea during day. Never had this prior. Had normal brown BM day prior, no melena or hematochezia. Denies alcohol use or other toxic ingestions. Denies any abdominal pain, diarrhea, chest pain, shortness of breath, dizziness, lightheadedness, syncope, fevers, or rash. No significant NSAID use. Denies any recent injury or trauma. Denies any personal or family history of any bleeding disorders.  Denies any additional complaints.     Allergies:  No Known Allergies      Home Medications:    Hospital Medications:  chlorhexidine 4% Liquid 1 Application(s) Topical <User Schedule>  dextrose 40% Gel 15 Gram(s) Oral once PRN  dextrose 5%. 1000 milliLiter(s) IV Continuous <Continuous>  dextrose 50% Injectable 12.5 Gram(s) IV Push once  dextrose 50% Injectable 25 Gram(s) IV Push once  dextrose 50% Injectable 25 Gram(s) IV Push once  glucagon  Injectable 1 milliGRAM(s) IntraMuscular once PRN  insulin lispro (HumaLOG) corrective regimen sliding scale   SubCutaneous every 6 hours  lactated ringers. 1000 milliLiter(s) IV Continuous <Continuous>  ondansetron Injectable 4 milliGRAM(s) IV Push every 6 hours PRN  pantoprazole Infusion 8 mG/Hr IV Continuous <Continuous>      PMHX/PSHX:  HLD (hyperlipidemia)  HTN (hypertension)  T2DM (type 2 diabetes mellitus)  No significant past surgical history      Family history:  No pertinent family history in first degree relatives      Social History:     ROS:     General:  No wt loss, fevers, chills, night sweats, fatigue,   Eyes:  Good vision, no reported pain  ENT:  No sore throat, pain, runny nose, dysphagia  CV:  No pain, palpitations, hypo/hypertension  Resp:  No dyspnea, cough, tachypnea, wheezing  GI:  See HPI  :  No pain, bleeding, incontinence, nocturia  Muscle:  No pain, weakness  Neuro:  No weakness, tingling, memory problems  Psych:  No fatigue, insomnia, mood problems, depression  Endocrine:  No polyuria, polydipsia, cold/heat intolerance  Heme:  No petechiae, ecchymosis, easy bruisability  Skin:  No rash, edema      PHYSICAL EXAM:     Vital Signs:  Vital Signs Last 24 Hrs  T(C): 37.4 (30 Oct 2019 08:00), Max: 37.8 (30 Oct 2019 05:24)  T(F): 99.4 (30 Oct 2019 08:00), Max: 100 (30 Oct 2019 05:24)  HR: 83 (30 Oct 2019 09:00) (83 - 105)  BP: 143/83 (30 Oct 2019 09:00) (115/66 - 143/83)  BP(mean): 101 (30 Oct 2019 09:00) (89 - 101)  RR: 19 (30 Oct 2019 09:00) (18 - 26)  SpO2: 98% (30 Oct 2019 09:00) (95% - 100%)  Daily Height in cm: 152.4 (30 Oct 2019 05:24)    Daily     GENERAL:  Appears stated age, well-groomed, well-nourished, no distress  HEENT:  NC/AT,  conjunctivae clear and pink, NGT in place with blood in tubing  CHEST:  Full & symmetric excursion, no increased effort, breath sounds clear  HEART:  Regular rhythm, S1, S2, no murmur/rub/S3/S4, no abdominal bruit, no edema, no JVD  ABDOMEN:  Soft, non-tender, non-distended, normoactive bowel sounds,  no masses , no hepatosplenomegaly  EXTREMITIES:  no cyanosis, clubbing or edema  SKIN:  No rash/erythema/ecchymoses/petechiae/wounds/abscess/warm/dry  NEURO:  Alert, oriented    LABS:                        10.7   12.37 )-----------( 197      ( 30 Oct 2019 07:00 )             33.1     10-29    141  |  98  |  24<H>  ----------------------------<  241<H>  4.3   |  23  |  0.97    Ca    9.4      29 Oct 2019 23:43  Phos  3.6     10-29  Mg     1.6     10-29    TPro  7.9  /  Alb  4.1  /  TBili  0.2  /  DBili  x   /  AST  28  /  ALT  33  /  AlkPhos  108  10-29    LIVER FUNCTIONS - ( 29 Oct 2019 23:43 )  Alb: 4.1 g/dL / Pro: 7.9 g/dL / ALK PHOS: 108 u/L / ALT: 33 u/L / AST: 28 u/L / GGT: x           PT/INR - ( 29 Oct 2019 23:43 )   PT: 10.3 SEC;   INR: 0.93          PTT - ( 29 Oct 2019 23:43 )  PTT:30.0 SEC        Imaging:

## 2019-10-31 DIAGNOSIS — I10 ESSENTIAL (PRIMARY) HYPERTENSION: ICD-10-CM

## 2019-10-31 DIAGNOSIS — E78.5 HYPERLIPIDEMIA, UNSPECIFIED: ICD-10-CM

## 2019-10-31 DIAGNOSIS — D50.0 IRON DEFICIENCY ANEMIA SECONDARY TO BLOOD LOSS (CHRONIC): ICD-10-CM

## 2019-10-31 DIAGNOSIS — K92.2 GASTROINTESTINAL HEMORRHAGE, UNSPECIFIED: ICD-10-CM

## 2019-10-31 DIAGNOSIS — I44.7 LEFT BUNDLE-BRANCH BLOCK, UNSPECIFIED: ICD-10-CM

## 2019-10-31 DIAGNOSIS — E11.9 TYPE 2 DIABETES MELLITUS WITHOUT COMPLICATIONS: ICD-10-CM

## 2019-10-31 LAB
ALBUMIN SERPL ELPH-MCNC: 3.3 G/DL — SIGNIFICANT CHANGE UP (ref 3.3–5)
ALP SERPL-CCNC: 73 U/L — SIGNIFICANT CHANGE UP (ref 40–120)
ALT FLD-CCNC: 26 U/L — SIGNIFICANT CHANGE UP (ref 4–33)
ANION GAP SERPL CALC-SCNC: 13 MMO/L — SIGNIFICANT CHANGE UP (ref 7–14)
AST SERPL-CCNC: 24 U/L — SIGNIFICANT CHANGE UP (ref 4–32)
BASOPHILS # BLD AUTO: 0.02 K/UL — SIGNIFICANT CHANGE UP (ref 0–0.2)
BASOPHILS NFR BLD AUTO: 0.2 % — SIGNIFICANT CHANGE UP (ref 0–2)
BILIRUB SERPL-MCNC: 0.3 MG/DL — SIGNIFICANT CHANGE UP (ref 0.2–1.2)
BUN SERPL-MCNC: 14 MG/DL — SIGNIFICANT CHANGE UP (ref 7–23)
CALCIUM SERPL-MCNC: 8.6 MG/DL — SIGNIFICANT CHANGE UP (ref 8.4–10.5)
CHLORIDE SERPL-SCNC: 103 MMOL/L — SIGNIFICANT CHANGE UP (ref 98–107)
CO2 SERPL-SCNC: 24 MMOL/L — SIGNIFICANT CHANGE UP (ref 22–31)
CREAT SERPL-MCNC: 0.82 MG/DL — SIGNIFICANT CHANGE UP (ref 0.5–1.3)
EOSINOPHIL # BLD AUTO: 0.12 K/UL — SIGNIFICANT CHANGE UP (ref 0–0.5)
EOSINOPHIL NFR BLD AUTO: 1.3 % — SIGNIFICANT CHANGE UP (ref 0–6)
GLUCOSE BLDC GLUCOMTR-MCNC: 187 MG/DL — HIGH (ref 70–99)
GLUCOSE BLDC GLUCOMTR-MCNC: 199 MG/DL — HIGH (ref 70–99)
GLUCOSE BLDC GLUCOMTR-MCNC: 254 MG/DL — HIGH (ref 70–99)
GLUCOSE BLDC GLUCOMTR-MCNC: 268 MG/DL — HIGH (ref 70–99)
GLUCOSE BLDC GLUCOMTR-MCNC: 286 MG/DL — HIGH (ref 70–99)
GLUCOSE SERPL-MCNC: 178 MG/DL — HIGH (ref 70–99)
HCT VFR BLD CALC: 29.9 % — LOW (ref 34.5–45)
HCT VFR BLD CALC: 32.8 % — LOW (ref 34.5–45)
HGB BLD-MCNC: 10.4 G/DL — LOW (ref 11.5–15.5)
HGB BLD-MCNC: 9.6 G/DL — LOW (ref 11.5–15.5)
IMM GRANULOCYTES NFR BLD AUTO: 0.2 % — SIGNIFICANT CHANGE UP (ref 0–1.5)
LYMPHOCYTES # BLD AUTO: 2.7 K/UL — SIGNIFICANT CHANGE UP (ref 1–3.3)
LYMPHOCYTES # BLD AUTO: 28.5 % — SIGNIFICANT CHANGE UP (ref 13–44)
MAGNESIUM SERPL-MCNC: 1.7 MG/DL — SIGNIFICANT CHANGE UP (ref 1.6–2.6)
MCHC RBC-ENTMCNC: 25.1 PG — LOW (ref 27–34)
MCHC RBC-ENTMCNC: 25.2 PG — LOW (ref 27–34)
MCHC RBC-ENTMCNC: 31.7 % — LOW (ref 32–36)
MCHC RBC-ENTMCNC: 32.1 % — SIGNIFICANT CHANGE UP (ref 32–36)
MCV RBC AUTO: 78.1 FL — LOW (ref 80–100)
MCV RBC AUTO: 79.4 FL — LOW (ref 80–100)
MONOCYTES # BLD AUTO: 0.8 K/UL — SIGNIFICANT CHANGE UP (ref 0–0.9)
MONOCYTES NFR BLD AUTO: 8.5 % — SIGNIFICANT CHANGE UP (ref 2–14)
NEUTROPHILS # BLD AUTO: 5.8 K/UL — SIGNIFICANT CHANGE UP (ref 1.8–7.4)
NEUTROPHILS NFR BLD AUTO: 61.3 % — SIGNIFICANT CHANGE UP (ref 43–77)
NRBC # FLD: 0 K/UL — SIGNIFICANT CHANGE UP (ref 0–0)
NRBC # FLD: 0 K/UL — SIGNIFICANT CHANGE UP (ref 0–0)
PHOSPHATE SERPL-MCNC: 2.6 MG/DL — SIGNIFICANT CHANGE UP (ref 2.5–4.5)
PLATELET # BLD AUTO: 194 K/UL — SIGNIFICANT CHANGE UP (ref 150–400)
PLATELET # BLD AUTO: 214 K/UL — SIGNIFICANT CHANGE UP (ref 150–400)
PMV BLD: 10.8 FL — SIGNIFICANT CHANGE UP (ref 7–13)
PMV BLD: 10.9 FL — SIGNIFICANT CHANGE UP (ref 7–13)
POTASSIUM SERPL-MCNC: 3.7 MMOL/L — SIGNIFICANT CHANGE UP (ref 3.5–5.3)
POTASSIUM SERPL-SCNC: 3.7 MMOL/L — SIGNIFICANT CHANGE UP (ref 3.5–5.3)
PROT SERPL-MCNC: 6.6 G/DL — SIGNIFICANT CHANGE UP (ref 6–8.3)
RBC # BLD: 3.83 M/UL — SIGNIFICANT CHANGE UP (ref 3.8–5.2)
RBC # BLD: 4.13 M/UL — SIGNIFICANT CHANGE UP (ref 3.8–5.2)
RBC # FLD: 14.6 % — HIGH (ref 10.3–14.5)
RBC # FLD: 14.8 % — HIGH (ref 10.3–14.5)
SODIUM SERPL-SCNC: 140 MMOL/L — SIGNIFICANT CHANGE UP (ref 135–145)
WBC # BLD: 10.33 K/UL — SIGNIFICANT CHANGE UP (ref 3.8–10.5)
WBC # BLD: 9.46 K/UL — SIGNIFICANT CHANGE UP (ref 3.8–10.5)
WBC # FLD AUTO: 10.33 K/UL — SIGNIFICANT CHANGE UP (ref 3.8–10.5)
WBC # FLD AUTO: 9.46 K/UL — SIGNIFICANT CHANGE UP (ref 3.8–10.5)

## 2019-10-31 PROCEDURE — 99232 SBSQ HOSP IP/OBS MODERATE 35: CPT | Mod: GC

## 2019-10-31 PROCEDURE — 99233 SBSQ HOSP IP/OBS HIGH 50: CPT | Mod: GC

## 2019-10-31 RX ORDER — INSULIN LISPRO 100/ML
VIAL (ML) SUBCUTANEOUS
Refills: 0 | Status: DISCONTINUED | OUTPATIENT
Start: 2019-10-31 | End: 2019-11-02

## 2019-10-31 RX ORDER — INSULIN LISPRO 100/ML
VIAL (ML) SUBCUTANEOUS AT BEDTIME
Refills: 0 | Status: DISCONTINUED | OUTPATIENT
Start: 2019-10-31 | End: 2019-11-02

## 2019-10-31 RX ORDER — ATORVASTATIN CALCIUM 80 MG/1
40 TABLET, FILM COATED ORAL AT BEDTIME
Refills: 0 | Status: DISCONTINUED | OUTPATIENT
Start: 2019-10-31 | End: 2019-11-02

## 2019-10-31 RX ORDER — INSULIN GLARGINE 100 [IU]/ML
13 INJECTION, SOLUTION SUBCUTANEOUS AT BEDTIME
Refills: 0 | Status: DISCONTINUED | OUTPATIENT
Start: 2019-10-31 | End: 2019-11-01

## 2019-10-31 RX ADMIN — Medication 3: at 12:18

## 2019-10-31 RX ADMIN — PANTOPRAZOLE SODIUM 10 MG/HR: 20 TABLET, DELAYED RELEASE ORAL at 11:26

## 2019-10-31 RX ADMIN — ATORVASTATIN CALCIUM 40 MILLIGRAM(S): 80 TABLET, FILM COATED ORAL at 22:35

## 2019-10-31 RX ADMIN — INSULIN GLARGINE 13 UNIT(S): 100 INJECTION, SOLUTION SUBCUTANEOUS at 22:35

## 2019-10-31 RX ADMIN — SODIUM CHLORIDE 75 MILLILITER(S): 9 INJECTION, SOLUTION INTRAVENOUS at 22:34

## 2019-10-31 RX ADMIN — SODIUM CHLORIDE 75 MILLILITER(S): 9 INJECTION, SOLUTION INTRAVENOUS at 11:25

## 2019-10-31 RX ADMIN — Medication 3: at 18:22

## 2019-10-31 RX ADMIN — Medication 1: at 05:38

## 2019-10-31 RX ADMIN — Medication 1: at 22:35

## 2019-10-31 RX ADMIN — PANTOPRAZOLE SODIUM 10 MG/HR: 20 TABLET, DELAYED RELEASE ORAL at 22:34

## 2019-10-31 NOTE — DISCHARGE NOTE PROVIDER - NSDCFUADDINST_GEN_ALL_CORE_FT
Please follow up with your primary care doctor in 1-3 days.    Please follow up with any specialists you have within 1 week. We had held two of your 3 blood pressure medications because your blood pressures have been on the normal end. Please follow with your primary care doctor within 1 week of discharge to discuss your hospital course and to discuss restarting your blood pressure medications.     Please follow up with the stomach doctors within 2 weeks, please take the pantoprazole twice a day as instructed.     We had adjusted your insulin dose as your blood sugars have been well controlled on lower doses. Please record your blood sugars three times a day with meals and bring the log with you to your primary care doctor within 1 week of discharge.

## 2019-10-31 NOTE — MEDICAL STUDENT PROGRESS NOTE(EDUCATION) - NS MD HP STUD ASPLAN PLAN FT
1. Hematemesis: likely 2/2 to Joanna-Mcbride tear, possibly induced by multiple episodes of emesis. Pt denies any history of EtOH intake, GI problems, or similar episodes in the past. Ddx also includes gastritis, PUD, vascular lesion.  - Pt is s/p upper endoscopy which showed Joanna-Mcbride tear with adherent clot at GE junction (clips were placed), moderately severe esophagitis, and gastritis.  - C/w protonix drip  - Appreciate GI recs    2. Anemia: Hb this AM was 9.6, down from 10.7 yesterday (however all cell lines are decreased therefore there may be a dilutional component). Concerning for active bleed.  - Pt is s/p 1u of pRBCs and 1u platelets 2/2 to drop in Hb on admission (11.4 --> 8.8)  - Add on retic count to assess for response 1. Hematemesis: ddx includes Joanna-Mcbride tear 2/2 multiple bouts of emesis, vs. esophagitis causing UGI bleed. Less likely causes include gastritis, PUD, and vascular lesion. Pt denies any history of EtOH intake, GI problems, or similar episodes in the past. Pt takes ASA 81mg QD at home, and Aleve for back pain 1-2x/wk for the past year. She reports symptoms of reflux including substernal/epigastric pain after meals, however denies use of Protonix/PPIs. Esophagitis may be reflux-induced; less likely eosinophilic (no history of asthma), or pill-induced (moderate use of NSAIDs).  - Pt is s/p upper endoscopy which showed Joanna-Mcbride tear with adherent clot at GE junction (clips were placed), moderately severe esophagitis, and gastritis.  - C/w protonix drip  - Hold home ASA in setting of active bleed  - Appreciate GI recs    2. Anemia: Hb this AM was 9.6, down from 10.7 yesterday (however all cell lines are decreased therefore there may be a dilutional component). Concerning for active bleed.  - Pt is s/p 1u of pRBCs and 1u platelets 2/2 to drop in Hb on admission (11.4 --> 8.8)  - Add on retic count to assess for response  - Transfuse if Hb <7  - CBC daily  - Active type and screen    3. Diabetes mellitus, type 2: patient's last A1c was 10.8 (10/29). Reports that she takes metformin, glipizide at home.  - Hold home meds  - ISS pre-meal  - Confirm home meds with pharmacy    4. Hyperlipidemia: patient reports that she takes Lipitor, unclear dose  - Med rec    5. Hypertension: pt's BP this AM was 137/63 1. Hematemesis: ddx includes Joanna-Mcbride tear 2/2 multiple bouts of emesis, vs. esophagitis causing UGI bleed. Less likely causes include gastritis, PUD, and vascular lesion. Pt denies any history of EtOH intake, GI problems, or similar episodes in the past. Pt takes ASA 81mg QD at home, and Aleve for back pain 1-2x/wk for the past year. She reports symptoms of reflux including substernal/epigastric pain after meals, however denies use of Protonix/PPIs. Esophagitis may be reflux-induced; less likely eosinophilic (no history of asthma), or pill-induced (moderate use of NSAIDs).  - Pt is s/p upper endoscopy which showed Joanna-Mcbride tear with adherent clot at GE junction (clips were placed), moderately severe esophagitis, and gastritis.  - C/w pantoprazole 40mg IV BID  - Hold home ASA in setting of active bleed  - Advance diet starting with clears, continue as tolerated  - Appreciate GI recs    2. Anemia: Hb this AM was 9.6, down from 10.7 yesterday (however all cell lines are decreased therefore there may be a dilutional component). Less concerning for active bleed.  - Pt is s/p 1u of pRBCs and 1u platelets 2/2 to drop in Hb on admission (11.4 --> 8.8)  - Add on retic count to assess for response  - Transfuse if Hb <7  - CBC daily  - Active type and screen    3. Diabetes mellitus, type 2: patient's last A1c was 10.8 (10/29). Reports that she takes metformin, glipizide at home.  - Hold home meds  - ISS pre-meal  - Confirm home meds with pharmacy    4. Hyperlipidemia: patient reports that she takes Lipitor, unclear dose  - Awaiting med rec    5. Hypertension: pt's BP this AM was 137/63  - Awaiting med rec

## 2019-10-31 NOTE — MEDICAL STUDENT PROGRESS NOTE(EDUCATION) - NS MD HP STUD ASPLAN ASSES FT
The patient is a 64 yo F with HTN, HLD, DM2 presenting with hematemesis x1d, found on imaging to have Joanna-Mcbride tear, moderately severe esophagitis, and gastritis. Labs were significant for anemia (Hb 9.6) which continues to worsen suggesting active bleed. The patient is a 64 yo F with HTN, HLD, DM2 presenting with hematemesis x1d, found on imaging to have Joanna-Mcbride tear, moderately severe esophagitis, and gastritis. Labs were significant for anemia (Hb 9.6) suggesting active bleed.

## 2019-10-31 NOTE — MEDICAL STUDENT PROGRESS NOTE(EDUCATION) - SUBJECTIVE AND OBJECTIVE BOX
Chief Complaint:  Patient is a 63y old  Female who presents with a chief complaint of Hematemesis Chief Complaint:  Patient is a 63y old  Female who presents with a chief complaint of Hematemesis     Overnight: no acute events. Patient has had no nausea, vomiting, or bowel movements. She denies headache, lightheadedness, dizziness, or heart palpitations.    Pt's daughter passed away last week; she returned on  after attending the  in [facility]. She is currently living with her other daughter.    REVIEW OF SYSTEMS:    CONSTITUTIONAL: No weakness, fevers or chills  EYES/ENT: No visual changes;  No vertigo or throat pain   NECK: No pain or stiffness  RESPIRATORY: No cough, wheezing, hemoptysis; No shortness of breath  CARDIOVASCULAR: No chest pain or palpitations  GASTROINTESTINAL: No abdominal or epigastric pain. No nausea, vomiting, or hematemesis; No diarrhea or constipation. No melena or hematochezia.  GENITOURINARY: No dysuria, frequency or hematuria  NEUROLOGICAL: No numbness or weakness  SKIN: No itching, rashes    Vital Signs (24 Hrs):  T(C): 36.8 (10-31-19 @ 05:36), Max: 37.4 (10-30-19 @ 08:00)  HR: 72 (10-31-19 @ 05:36) (66 - 88)  BP: 137/63 (10-31-19 @ 05:36) (105/65 - 174/98)  RR: 16 (10-31-19 @ 05:36) (13 - 25)  SpO2: 97% (10-31-19 @ 05:36) (97% - 100%)  Wt(kg): --  Daily     Daily     I&O's Summary    30 Oct 2019 07:01  -  31 Oct 2019 07:00  --------------------------------------------------------  IN: 360 mL / OUT: 400 mL / NET: -40 mL    PHYSICAL EXAM:    General: WN/WD NAD  Neurology: A&Ox3  Respiratory: CTA B/L  CV: RRR, S1S2, no murmurs, rubs or gallops  Abdominal: Soft, NT, ND +BS, Last BM 10/29 overnight (pt is NPO currently)  Extremities: No edema, + peripheral pulses      LABS:              Hb 9.6 (<--10.7 <--- 11.4)               9.6    9.46  )-----------( 194      ( 31 Oct 2019 05:17 )             29.9       140  |  103  |  14  ----------------------------<  178<H>  3.7   |  24  |  0.82    Ca    8.6      31 Oct 2019 05:17  Phos  2.6     10-31  Mg     1.7     10-31    TPro  6.6  /  Alb  3.3  /  TBili  0.3  /  DBili  x   /  AST  24  /  ALT  26  /  AlkPhos  73  10-31    PT/INR - ( 29 Oct 2019 23:43 )   PT: 10.3 SEC;   INR: 0.93          PTT - ( 29 Oct 2019 23:43 )  PTT:30.0 SEC      IMAGING    Upper Endoscopy (10/30)  Impression:          - Joanna-Mcbride tear with adherent clot seen at the                        lower esophagus (GEJ). Clips (MR conditional) were                        placed.                       - Joanna-Mcbride tear.                       - Moderately severe esophagitis.                       - Gastritis.                       - Normal examined duodenum.                       - No specimens collected.

## 2019-10-31 NOTE — PROGRESS NOTE ADULT - SUBJECTIVE AND OBJECTIVE BOX
Chief Complaint:  Patient is a 63y old  Female who presents with a chief complaint of Hematemesis (31 Oct 2019 07:45)      Interval Events: No more nausea. Denies melena, hematochezia, hematemesis, vomiting. Pt not yet have a BM since procedure.    Allergies:  No Known Allergies      Hospital Medications:  dextrose 40% Gel 15 Gram(s) Oral once PRN  dextrose 5%. 1000 milliLiter(s) IV Continuous <Continuous>  dextrose 50% Injectable 12.5 Gram(s) IV Push once  dextrose 50% Injectable 25 Gram(s) IV Push once  dextrose 50% Injectable 25 Gram(s) IV Push once  glucagon  Injectable 1 milliGRAM(s) IntraMuscular once PRN  insulin lispro (HumaLOG) corrective regimen sliding scale   SubCutaneous every 6 hours  lactated ringers. 1000 milliLiter(s) IV Continuous <Continuous>  ondansetron Injectable 4 milliGRAM(s) IV Push every 6 hours PRN  pantoprazole Infusion 8 mG/Hr IV Continuous <Continuous>      PMHX/PSHX:  HLD (hyperlipidemia)  HTN (hypertension)  T2DM (type 2 diabetes mellitus)  No significant past surgical history      Family history:  No pertinent family history in first degree relatives      ROS:     General:  No wt loss, fevers, chills, night sweats, fatigue,   Eyes:  Good vision, no reported pain  ENT:  No sore throat, pain, runny nose, dysphagia  CV:  No pain, palpitations, hypo/hypertension  Resp:  No dyspnea, cough, tachypnea, wheezing  GI:  See HPI  :  No pain, bleeding, incontinence, nocturia  Muscle:  No pain, weakness  Neuro:  No weakness, tingling, memory problems  Psych:  No fatigue, insomnia, mood problems, depression  Endocrine:  No polyuria, polydipsia, cold/heat intolerance  Heme:  No petechiae, ecchymosis, easy bruisability  Skin:  No rash, edema      PHYSICAL EXAM:     Vital Signs:  Vital Signs Last 24 Hrs  T(C): 36.8 (31 Oct 2019 05:36), Max: 36.9 (30 Oct 2019 15:37)  T(F): 98.2 (31 Oct 2019 05:36), Max: 98.5 (30 Oct 2019 15:37)  HR: 72 (31 Oct 2019 05:36) (66 - 88)  BP: 137/63 (31 Oct 2019 05:36) (105/65 - 174/98)  BP(mean): 84 (30 Oct 2019 15:00) (76 - 119)  RR: 16 (31 Oct 2019 05:36) (13 - 25)  SpO2: 97% (31 Oct 2019 05:36) (97% - 100%)  Daily     Daily     GENERAL:  appears comfortable, no acute distress  HEENT:  NC/AT,  conjunctivae clear, sclera -anicteric  CHEST:  CTABL, no increased effort  HEART:  Regular rhythm, S1, S2, no murmur/rub/S3/S4  ABDOMEN:  Soft, non-tender, non-distended, normoactive bowel sounds,  no masses ,no hepato-splenomegaly,   EXTREMITIES:  no cyanosis, clubbing or edema  SKIN:  No rash/erythema/ecchymoses/petechiae/wounds  NEURO:  Alert, oriented    LABS:                        9.6    9.46  )-----------( 194      ( 31 Oct 2019 05:17 )             29.9     10-31    140  |  103  |  14  ----------------------------<  178<H>  3.7   |  24  |  0.82    Ca    8.6      31 Oct 2019 05:17  Phos  2.6     10-31  Mg     1.7     10-31    TPro  6.6  /  Alb  3.3  /  TBili  0.3  /  DBili  x   /  AST  24  /  ALT  26  /  AlkPhos  73  10-31    LIVER FUNCTIONS - ( 31 Oct 2019 05:17 )  Alb: 3.3 g/dL / Pro: 6.6 g/dL / ALK PHOS: 73 u/L / ALT: 26 u/L / AST: 24 u/L / GGT: x           PT/INR - ( 29 Oct 2019 23:43 )   PT: 10.3 SEC;   INR: 0.93          PTT - ( 29 Oct 2019 23:43 )  PTT:30.0 SEC        Imaging:      < from: Upper Endoscopy (10.30.19 @ 13:16) >  Findings:       A 10 mm non-bleeding Joanna-Mcbride tear with stigmata of recent bleeding        (adherent clot) was found at lower esophagus (GEJ). For hemostasis, two        hemostatic clips were successfully placed (MR conditional). There was no        bleeding at the end of the procedure.       A 7 mmnon-bleeding Joanna-Mcbride tear with no stigmata of recent        bleeding was found on the opposite wall of the lower esophagus. No        intervention performed.       Moderately severe esophagitis with no bleeding was found.       Patchy mild inflammation characterized by congestion (edema) and        erythema was found in the stomach.       The examined duodenum was normal.                                                                                   Impression:          - Joanna-Mcbride tear with adherent clot seen at the                        lower esophagus (GEJ). Clips (MR conditional) were                        placed.                       - Joanna-Mcbride tear.                       - Moderately severe esophagitis.           - Gastritis.                       - Normal examined duodenum.                       - No specimens collected.  Recommendation:      - Continue ongoing care in MICU.                       - Use a proton pump inhibitor IV BID.        - Clear liquid diet.    < end of copied text >

## 2019-10-31 NOTE — DISCHARGE NOTE PROVIDER - NSDCMRMEDTOKEN_GEN_ALL_CORE_FT
amLODIPine 10 mg oral tablet: 1 tab(s) orally once a day  atenolol-chlorthalidone 50 mg-25 mg oral tablet: 1 tab(s) orally once a day  atorvastatin 40 mg oral tablet: 1 tab(s) orally once a day  Basaglar KwikPen 100 units/mL subcutaneous solution: 50 unit(s) subcutaneous once a day (at bedtime)  glipiZIDE 10 mg oral tablet: 1 tab(s) orally once a day  Januvia 100 mg oral tablet: 1 tab(s) orally once a day  losartan 100 mg oral tablet: 1 tab(s) orally once a day  metFORMIN 1000 mg oral tablet: 1 tab(s) orally 2 times a day amLODIPine 10 mg oral tablet: 1 tab(s) orally once a day  atenolol-chlorthalidone 50 mg-25 mg oral tablet: 1 tab(s) orally once a day  atorvastatin 40 mg oral tablet: 1 tab(s) orally once a day  Basaglar KwikPen 100 units/mL subcutaneous solution: 50 unit(s) subcutaneous once a day (at bedtime)  glipiZIDE 10 mg oral tablet: 1 tab(s) orally once a day  Januvia 100 mg oral tablet: 1 tab(s) orally once a day  losartan 100 mg oral tablet: 1 tab(s) orally once a day  metFORMIN 1000 mg oral tablet: 1 tab(s) orally 2 times a day  pantoprazole 40 mg oral delayed release tablet: 1 tab(s) orally 2 times a day atenolol-chlorthalidone 50 mg-25 mg oral tablet: 1 tab(s) orally once a day  atorvastatin 40 mg oral tablet: 1 tab(s) orally once a day  Basaglar KwikPen 100 units/mL subcutaneous solution: 25 unit(s) subcutaneous once a day (at bedtime)   glipiZIDE 10 mg oral tablet: 1 tab(s) orally once a day  Januvia 100 mg oral tablet: 1 tab(s) orally once a day  metFORMIN 1000 mg oral tablet: 1 tab(s) orally 2 times a day  pantoprazole 40 mg oral delayed release tablet: 1 tab(s) orally 2 times a day atenolol-chlorthalidone 50 mg-25 mg oral tablet: 1 tab(s) orally once a day  atorvastatin 40 mg oral tablet: 1 tab(s) orally once a day  glipiZIDE 10 mg oral tablet: 1 tab(s) orally once a day  Januvia 100 mg oral tablet: 1 tab(s) orally once a day  metFORMIN 1000 mg oral tablet: 1 tab(s) orally 2 times a day  pantoprazole 40 mg oral delayed release tablet: 1 tab(s) orally 2 times a day atorvastatin 40 mg oral tablet: 1 tab(s) orally once a day  glipiZIDE 10 mg oral tablet: 1 tab(s) orally once a day  insulin glargine: 20 unit(s) subcutaneous once a day (at bedtime)  Januvia 100 mg oral tablet: 1 tab(s) orally once a day  metFORMIN 1000 mg oral tablet: 1 tab(s) orally 2 times a day  pantoprazole 40 mg oral delayed release tablet: 1 tab(s) orally 2 times a day amLODIPine 10 mg oral tablet: 1 tab(s) orally once a day  atorvastatin 40 mg oral tablet: 1 tab(s) orally once a day  glipiZIDE 10 mg oral tablet: 1 tab(s) orally once a day  insulin glargine: 20 unit(s) subcutaneous once a day (at bedtime)  Januvia 100 mg oral tablet: 1 tab(s) orally once a day  metFORMIN 1000 mg oral tablet: 1 tab(s) orally 2 times a day  pantoprazole 40 mg oral delayed release tablet: 1 tab(s) orally 2 times a day amLODIPine 10 mg oral tablet: 1 tab(s) orally once a day  atorvastatin 40 mg oral tablet: 1 tab(s) orally once a day  Basaglar KwikPen 100 units/mL subcutaneous solution: 20 unit(s) subcutaneous once a day (at bedtime)   glipiZIDE 10 mg oral tablet: 1 tab(s) orally once a day  Januvia 100 mg oral tablet: 1 tab(s) orally once a day  metFORMIN 1000 mg oral tablet: 1 tab(s) orally 2 times a day  pantoprazole 40 mg oral delayed release tablet: 1 tab(s) orally 2 times a day

## 2019-10-31 NOTE — PROGRESS NOTE ADULT - PROBLEM SELECTOR PLAN 4
-awaiting med rec -home amlodipine 10, atenolol/chlor 50/25, losartan 100 -held home metformin, januvia, glipizide  -home glargine 50U bedtime  -ISS -held home metformin, januvia, glipizide  -home glargine 50U bedtime, will give 13U and titrate up as needed  -ISS

## 2019-10-31 NOTE — PROGRESS NOTE ADULT - PROBLEM SELECTOR PROBLEM 4
Hypertension, unspecified type Type 2 diabetes mellitus without complication, unspecified whether long term insulin use

## 2019-10-31 NOTE — PROGRESS NOTE ADULT - PROBLEM SELECTOR PROBLEM 3
Type 2 diabetes mellitus without complication, unspecified whether long term insulin use Bundle branch block, left

## 2019-10-31 NOTE — PROGRESS NOTE ADULT - PROBLEM SELECTOR PLAN 1
-Pt admitted for hematemesis  -UGIB 2/2 PUD vs gastritis vs vascular lesion vs erosive gastritis vs esophagitis vs dieulafoy lesion vs angiectasia  -GI consult: plan for upper scope, pt NPO  -PPI, zofran -Pt admitted for hematemesis, not actively bleeding  -UGIB 2/2 Joanna-Mcbride tear, esophagitis per scope  -GI consult: can advance diet to clears  -PPI, zofran

## 2019-10-31 NOTE — DISCHARGE NOTE PROVIDER - CARE PROVIDER_API CALL
Sofiya Cunha  [address] [place] [address]  Phone: [phone]  Fax: (   )    -  Follow Up Time: Sofiya Cunha  [address] [place] [address]  Phone: [phone]  Fax: (   )    -  Follow Up Time:     Tracey Ortiz)  Gastroenterology; Internal Medicine  [address] [place] [address]  Phone: [phone]  Fax: [phone]  Follow Up Time:

## 2019-10-31 NOTE — DISCHARGE NOTE PROVIDER - NSFOLLOWUPCLINICS_GEN_ALL_ED_FT
[facility]astroenter[facility]  Gastroenterology  [address] [place] [address]  Phone: [phone]  Fax:   Follow Up Time:

## 2019-10-31 NOTE — PROGRESS NOTE ADULT - PROBLEM SELECTOR PLAN 3
-held home metformin  -ISS -held home metformin, januvia, glipizide  -home glargine 50U bedtime  -ISS -LBBB found on ECG, no previous test to compare to  -will reach out to PMD for further info  -TTE ordered

## 2019-10-31 NOTE — DISCHARGE NOTE PROVIDER - HOSPITAL COURSE
63 year old female with PMH DM, HTN, HLD presented with hematemesis x 1 day.  Pt states she started feeling nauseous approximately 2 days ago and had approximately 5 episodes of bright red bloody emesis yesterday.  Denies any history of prior episodes.  Denies any EtOH use.  Last BM yesterday, normal as per pt, denies any bloody stools or black tarry stools. Denies any abdominal pain, diarrhea, chest pain, shortness of breath, dizziness, lightheadedness, syncope, fevers, or rash.  Denies any pain with PO intake.  Denies any recent injury or trauma. Denies any personal or family history of any bleeding disorders.  Denies any additional complaints.         Studies Done:        Upper Endpscopy:        Impression:          - Joanna-Mcbride tear with adherent clot seen at the                          lower esophagus (GEJ). Clips (MR conditional) were                          placed.                         - Joanna-Mcbride tear.                         - Moderately severe esophagitis.                         - Gastritis.                         - Normal examined duodenum.                         - No specimens collected.        Pt placed on PPI 63 year old female with PMH DM, HTN, HLD presented with hematemesis x 1 day.  Pt states she started feeling nauseous approximately 2 days ago and had approximately 5 episodes of bright red bloody emesis yesterday.  Denies any history of prior episodes.  Denies any EtOH use.  Last BM yesterday, normal as per pt, denies any bloody stools or black tarry stools. Denies any abdominal pain, diarrhea, chest pain, shortness of breath, dizziness, lightheadedness, syncope, fevers, or rash.  Denies any pain with PO intake.  Denies any recent injury or trauma. Denies any personal or family history of any bleeding disorders.  Denies any additional complaints.         On admission found to have Hgb drop 11.4->8.8. Pt taken to MICU and given 1 unit pRBC and 1 unit of platelets w/ good response. Hgb 10.7. GI was consulted and a scope was done.        Studies Done:        Upper Endpscopy:        Impression:  - Joanna-Mcbride tear with adherent clot seen at the                          lower esophagus (GEJ). Clips (MR conditional) were                          placed.                         - Joanna-Mcbride tear.                         - Moderately severe esophagitis.                         - Gastritis.                         - Normal examined duodenum.                         - No specimens collected.        A clip was placed; Pt placed on PPI IV then switched to po bid (to be given a 2 week course on d/c). Her diet was slowly advanced. Her Hb stabilized at 10.2.        The patient was seen and evaluated and deemed medically stable for discharge. 63 year old female with PMH DM, HTN, HLD presented with hematemesis x 1 day.  Pt states she started feeling nauseous approximately 2 days ago and had approximately 5 episodes of bright red bloody emesis yesterday.  Denies any history of prior episodes.  Denies any EtOH use.  Last BM yesterday, normal as per pt, denies any bloody stools or black tarry stools. Denies any abdominal pain, diarrhea, chest pain, shortness of breath, dizziness, lightheadedness, syncope, fevers, or rash.  Denies any pain with PO intake.  Denies any recent injury or trauma. Denies any personal or family history of any bleeding disorders.  Denies any additional complaints.         Patient was admitted for acute blood loss anemia requiring 1 unit PRBC and 1 unit plt. Patient underwent an EGD: MW tear at GE junction, and severe esophagitis with one clip placed. Patient started on PPi BID with follow up recommended on DC. Of note, patient had LBBB on EKG, which was confirmed to be old by PCP. Patient had work up at OSH with negative workup. Patient medically stable for discharge. On DC adjusted lantus dose and started one of the BP meds - - while holding her other medications 2/2 normal pressures. 63 year old female with PMH DM, HTN, HLD presented with hematemesis x 1 day.  Pt states she started feeling nauseous approximately 2 days ago and had approximately 5 episodes of bright red bloody emesis yesterday.  Denies any history of prior episodes.  Denies any EtOH use.  Last BM yesterday, normal as per pt, denies any bloody stools or black tarry stools. Denies any abdominal pain, diarrhea, chest pain, shortness of breath, dizziness, lightheadedness, syncope, fevers, or rash.  Denies any pain with PO intake.  Denies any recent injury or trauma. Denies any personal or family history of any bleeding disorders.  Denies any additional complaints.         Patient was admitted for acute blood loss anemia requiring 1 unit PRBC and 1 unit plt. Patient underwent an EGD: MW tear at GE junction, and severe esophagitis with one clip placed. Patient started on PPi BID with follow up recommended on DC. Of note, patient had LBBB on EKG, which was confirmed to be old by PCP. Patient had work up at OSH with negative workup. Patient medically stable for discharge. On DC adjusted lantus dose and held her anti-HTNs in the setting of normotensive. 63 year old female with PMH DM, HTN, HLD presented with hematemesis x 1 day.  Pt states she started feeling nauseous approximately 2 days ago and had approximately 5 episodes of bright red bloody emesis yesterday.  Denies any history of prior episodes.  Denies any EtOH use.  Last BM yesterday, normal as per pt, denies any bloody stools or black tarry stools. Denies any abdominal pain, diarrhea, chest pain, shortness of breath, dizziness, lightheadedness, syncope, fevers, or rash.  Denies any pain with PO intake.  Denies any recent injury or trauma. Denies any personal or family history of any bleeding disorders.  Denies any additional complaints.         Patient was admitted for acute blood loss anemia requiring 1 unit PRBC and 1 unit plt. Patient underwent an EGD: MW tear at GE junction, and severe esophagitis with one clip placed. Patient started on PPi BID with follow up recommended on DC. Of note, patient had LBBB on EKG, which was confirmed to be old by PCP. Patient had work up at OSH with negative workup. Patient medically stable for discharge. On DC adjusted lantus dose and amlodipine restarted upon discharge.

## 2019-10-31 NOTE — PROGRESS NOTE ADULT - PROBLEM SELECTOR PLAN 2
-Hb 9.6  -CBC daily  -Transfuse if Hb<7, active type and screen -Hb 9.6  -CBC q12  -Transfuse if Hb<7, active type and screen

## 2019-10-31 NOTE — PROGRESS NOTE ADULT - ASSESSMENT
Impression:  # Hematemesisi: secondary to tory hassan tear in setting of nausea and repeated vomiting after eating soup. Hgb slight decline but no overt bleeding.   # T2DM  # Hypertension  # hyperlipidemia    Recommendation:  - pantoprazole 40mg IV BID  - trend CBC, CMP, INR  - 2 large bore IVs; active type and screen  - transfuse Hgb > 7, Platelets > 50  - advance diet slowly, trial clear liquid diet and advance afterwards as tolerated  - supportive care as per primary team Impression:  # Hematemesisi: secondary to tory hassan tear in setting of nausea and repeated vomiting after eating soup. Hgb slight decline but no overt bleeding.   # T2DM  # Hypertension  # hyperlipidemia    Recommendation:  - pantoprazole 40mg IV BID  - trend CBC, CMP, INR  - 2 large bore IVs; active type and screen  - transfuse Hgb > 7, Platelets > 50  - trial pureed diet for next 2-3 days then advance afterwards  - supportive care as per primary team

## 2019-10-31 NOTE — PROGRESS NOTE ADULT - SUBJECTIVE AND OBJECTIVE BOX
Patient is a 63y old  Female who presents with a chief complaint of Hematemesis (30 Oct 2019 09:23)      INTERVAL HPI/OVERNIGHT EVENTS:      REVIEW OF SYSTEMS:  CONSTITUTIONAL: No fever, weight loss, or fatigue  EYES: No eye pain, visual disturbances, or discharge  ENMT:  No difficulty hearing, tinnitus, vertigo; No sinus or throat pain  NECK: No pain or stiffness  BREASTS: No pain, masses, or nipple discharge  RESPIRATORY: No cough, wheezing, chills or hemoptysis; No shortness of breath  CARDIOVASCULAR: No chest pain, palpitations, dizziness, or leg swelling  GASTROINTESTINAL: No abdominal or epigastric pain. No nausea, vomiting, or hematemesis; No diarrhea or constipation. No melena or hematochezia.  GENITOURINARY: No dysuria, frequency, hematuria, or incontinence  NEUROLOGICAL: No headaches, memory loss, loss of strength, numbness, or tremors  SKIN: No itching, burning, rashes, or lesions   LYMPH NODES: No enlarged glands  ENDOCRINE: No heat or cold intolerance; No hair loss  MUSCULOSKELETAL: No joint pain or swelling; No muscle, back, or extremity pain  PSYCHIATRIC: No depression, anxiety, mood swings, or difficulty sleeping  HEME/LYMPH: No easy bruising, or bleeding gums  ALLERY AND IMMUNOLOGIC: No hives or eczema    FAMILY HISTORY:  No pertinent family history in first degree relatives    T(C): 36.8 (10-31-19 @ 05:36), Max: 37.4 (10-30-19 @ 08:00)  HR: 72 (10-31-19 @ 05:36) (66 - 88)  BP: 137/63 (10-31-19 @ 05:36) (105/65 - 174/98)  RR: 16 (10-31-19 @ 05:36) (13 - 25)  SpO2: 97% (10-31-19 @ 05:36) (97% - 100%)  Wt(kg): --Vital Signs Last 24 Hrs  T(C): 36.8 (31 Oct 2019 05:36), Max: 37.4 (30 Oct 2019 08:00)  T(F): 98.2 (31 Oct 2019 05:36), Max: 99.4 (30 Oct 2019 08:00)  HR: 72 (31 Oct 2019 05:36) (66 - 88)  BP: 137/63 (31 Oct 2019 05:36) (105/65 - 174/98)  BP(mean): 84 (30 Oct 2019 15:00) (76 - 119)  RR: 16 (31 Oct 2019 05:36) (13 - 25)  SpO2: 97% (31 Oct 2019 05:36) (97% - 100%)    PHYSICAL EXAM:  GENERAL: NAD, well-groomed, well-developed  HEAD:  Atraumatic, Normocephalic  EYES: EOMI, PERRLA, conjunctiva and sclera clear  ENMT: No tonsillar erythema, exudates, or enlargement; Moist mucous membranes, Good dentition, No lesions  NECK: Supple, No JVD, Normal thyroid  NERVOUS SYSTEM:  Alert & Oriented X3, Good concentration; Motor Strength 5/5 B/L upper and lower extremities; DTRs 2+ intact and symmetric  CHEST/LUNG: Clear to percussion bilaterally; No rales, rhonchi, wheezing, or rubs  HEART: Regular rate and rhythm; No murmurs, rubs, or gallops  ABDOMEN: Soft, Nontender, Nondistended; Bowel sounds present  EXTREMITIES:  2+ Peripheral Pulses, No clubbing, cyanosis, or edema  LYMPH: No lymphadenopathy noted  SKIN: No rashes or lesions    Consultant(s) Notes Reviewed:  [x ] YES  [ ] NO  Care Discussed with Consultants/Other Providers [ x] YES  [ ] NO    LABS:                        9.6    9.46  )-----------( 194      ( 31 Oct 2019 05:17 )             29.9     31 Oct 2019 05:17    140    |  103    |  14     ----------------------------<  178    3.7     |  24     |  0.82     Ca    8.6        31 Oct 2019 05:17  Phos  2.6       31 Oct 2019 05:17  Mg     1.7       31 Oct 2019 05:17    TPro  6.6    /  Alb  3.3    /  TBili  0.3    /  DBili  x      /  AST  24     /  ALT  26     /  AlkPhos  73     31 Oct 2019 05:17    PT/INR - ( 29 Oct 2019 23:43 )   PT: 10.3 SEC;   INR: 0.93          PTT - ( 29 Oct 2019 23:43 )  PTT:30.0 SEC  CAPILLARY BLOOD GLUCOSE      POCT Blood Glucose.: 187 mg/dL (31 Oct 2019 05:31)  POCT Blood Glucose.: 182 mg/dL (30 Oct 2019 22:13)  POCT Blood Glucose.: 205 mg/dL (30 Oct 2019 17:12)  POCT Blood Glucose.: 225 mg/dL (30 Oct 2019 11:07)    BLOOD CULTURE      RADIOLOGY & ADDITIONAL TESTS:    Imaging Personally Reviewed:  [ ] YES  [ ] NO  dextrose 40% Gel 15 Gram(s) Oral once PRN  dextrose 5%. 1000 milliLiter(s) IV Continuous <Continuous>  dextrose 50% Injectable 12.5 Gram(s) IV Push once  dextrose 50% Injectable 25 Gram(s) IV Push once  dextrose 50% Injectable 25 Gram(s) IV Push once  glucagon  Injectable 1 milliGRAM(s) IntraMuscular once PRN  insulin lispro (HumaLOG) corrective regimen sliding scale   SubCutaneous every 6 hours  lactated ringers. 1000 milliLiter(s) IV Continuous <Continuous>  ondansetron Injectable 4 milliGRAM(s) IV Push every 6 hours PRN  pantoprazole Infusion 8 mG/Hr IV Continuous <Continuous>      HEALTH ISSUES - PROBLEM Dx: Patient is a 63y old  Female who presents with a chief complaint of Hematemesis (30 Oct 2019 09:23)      INTERVAL HPI/OVERNIGHT EVENTS: No events. Pt denies hematemesis, N/V/D, BRBPR, dizziness, lightheadedness.       REVIEW OF SYSTEMS:  CONSTITUTIONAL: No fever, weight loss, or fatigue  EYES: No eye pain, visual disturbances, or discharge  ENMT:  No difficulty hearing, tinnitus, vertigo; No sinus or throat pain  NECK: No pain or stiffness  BREASTS: No pain, masses, or nipple discharge  RESPIRATORY: No cough, wheezing, chills or hemoptysis; No shortness of breath  CARDIOVASCULAR: No chest pain, palpitations, dizziness, or leg swelling  GASTROINTESTINAL: No abdominal or epigastric pain. No nausea, vomiting, or hematemesis; No diarrhea or constipation. No melena or hematochezia.  GENITOURINARY: No dysuria, frequency, hematuria, or incontinence  NEUROLOGICAL: No headaches, memory loss, loss of strength, numbness, or tremors  SKIN: No itching, burning, rashes, or lesions   LYMPH NODES: No enlarged glands  ENDOCRINE: No heat or cold intolerance; No hair loss  MUSCULOSKELETAL: No joint pain or swelling; No muscle, back, or extremity pain    FAMILY HISTORY:  No pertinent family history in first degree relatives    T(C): 36.8 (10-31-19 @ 05:36), Max: 37.4 (10-30-19 @ 08:00)  HR: 72 (10-31-19 @ 05:36) (66 - 88)  BP: 137/63 (10-31-19 @ 05:36) (105/65 - 174/98)  RR: 16 (10-31-19 @ 05:36) (13 - 25)  SpO2: 97% (10-31-19 @ 05:36) (97% - 100%)  Wt(kg): --Vital Signs Last 24 Hrs  T(C): 36.8 (31 Oct 2019 05:36), Max: 37.4 (30 Oct 2019 08:00)  T(F): 98.2 (31 Oct 2019 05:36), Max: 99.4 (30 Oct 2019 08:00)  HR: 72 (31 Oct 2019 05:36) (66 - 88)  BP: 137/63 (31 Oct 2019 05:36) (105/65 - 174/98)  BP(mean): 84 (30 Oct 2019 15:00) (76 - 119)  RR: 16 (31 Oct 2019 05:36) (13 - 25)  SpO2: 97% (31 Oct 2019 05:36) (97% - 100%)    PHYSICAL EXAM:  GENERAL: NAD, well-groomed, well-developed  HEAD:  Atraumatic, Normocephalic  EYES: EOMI, PERRLA, conjunctiva and sclera clear  ENMT: No tonsillar erythema, exudates, or enlargement; Moist mucous membranes, Good dentition, No lesions  NECK: Supple, No JVD, Normal thyroid  NERVOUS SYSTEM:  Alert & Oriented X3, Good concentration; Motor Strength 5/5 B/L upper and lower extremities; DTRs 2+ intact and symmetric  CHEST/LUNG: Clear to percussion bilaterally; No rales, rhonchi, wheezing, or rubs  HEART: Regular rate and rhythm; No murmurs, rubs, or gallops  ABDOMEN: Soft, Nontender, Nondistended; Bowel sounds present  EXTREMITIES:  2+ Peripheral Pulses, No clubbing, cyanosis, or edema  LYMPH: No lymphadenopathy noted  SKIN: No rashes or lesions    Consultant(s) Notes Reviewed:  [x ] YES  [ ] NO  Care Discussed with Consultants/Other Providers [ x] YES  [ ] NO    LABS:                        9.6    9.46  )-----------( 194      ( 31 Oct 2019 05:17 )             29.9     31 Oct 2019 05:17    140    |  103    |  14     ----------------------------<  178    3.7     |  24     |  0.82     Ca    8.6        31 Oct 2019 05:17  Phos  2.6       31 Oct 2019 05:17  Mg     1.7       31 Oct 2019 05:17    TPro  6.6    /  Alb  3.3    /  TBili  0.3    /  DBili  x      /  AST  24     /  ALT  26     /  AlkPhos  73     31 Oct 2019 05:17    PT/INR - ( 29 Oct 2019 23:43 )   PT: 10.3 SEC;   INR: 0.93          PTT - ( 29 Oct 2019 23:43 )  PTT:30.0 SEC  CAPILLARY BLOOD GLUCOSE      POCT Blood Glucose.: 187 mg/dL (31 Oct 2019 05:31)  POCT Blood Glucose.: 182 mg/dL (30 Oct 2019 22:13)  POCT Blood Glucose.: 205 mg/dL (30 Oct 2019 17:12)  POCT Blood Glucose.: 225 mg/dL (30 Oct 2019 11:07)    BLOOD CULTURE      RADIOLOGY & ADDITIONAL TESTS:    Imaging Personally Reviewed:  [ ] YES  [ ] NO  dextrose 40% Gel 15 Gram(s) Oral once PRN  dextrose 5%. 1000 milliLiter(s) IV Continuous <Continuous>  dextrose 50% Injectable 12.5 Gram(s) IV Push once  dextrose 50% Injectable 25 Gram(s) IV Push once  dextrose 50% Injectable 25 Gram(s) IV Push once  glucagon  Injectable 1 milliGRAM(s) IntraMuscular once PRN  insulin lispro (HumaLOG) corrective regimen sliding scale   SubCutaneous every 6 hours  lactated ringers. 1000 milliLiter(s) IV Continuous <Continuous>  ondansetron Injectable 4 milliGRAM(s) IV Push every 6 hours PRN  pantoprazole Infusion 8 mG/Hr IV Continuous <Continuous>      HEALTH ISSUES - PROBLEM Dx:

## 2019-10-31 NOTE — PROGRESS NOTE ADULT - ATTENDING COMMENTS
63 y.o. Female w/ hx HTN, DM2, hyperlipidemia, chronic lower back pain p/w N/V after eating soup which progressed to hematemesis and acute blood loss anemia. She had EGD showing Joanna hassan tear which was clipped, gastritis and esophagitis. Patient is s/p 1 Unit PRBCs with stable H/H. Monitoring H/H. Check TTE for new LBBB.

## 2019-10-31 NOTE — PROGRESS NOTE ADULT - ASSESSMENT
63 year old female with PMH DM, HTN, HLD presented with hematemesis x 1 day, foudn to have Joanna-Mcbride teat on upper endoscopy.

## 2019-10-31 NOTE — CHART NOTE - NSCHARTNOTEFT_GEN_A_CORE
PMD contacted today 14:38 regarding patient's cardiac history and finding of LBBB. Per PMD, LBBB is an old finding that had full workup in 2017 at [facility], of no significance.    PMD: Anai Cunha  [phone]    Zunilda Guillen  MS3 PMD contacted today 14:38 regarding patient's cardiac history and finding of LBBB. Per PMD, LBBB is an old finding that had full workup in 2017 at [place], of no significance.    PMD: Anai Cunha  [phone]

## 2019-10-31 NOTE — DISCHARGE NOTE PROVIDER - CARE PROVIDERS DIRECT ADDRESSES
,DirectAddress_Unknown ,DirectAddress_Unknown,[email or web address]

## 2019-10-31 NOTE — DISCHARGE NOTE PROVIDER - PROVIDER TOKENS
FREE:[LAST:[Guerline],FIRST:[Sofiya],PHONE:[phone]],FAX:[(   )    -],ADDRESS:[[address] [place] [address]]] FREE:[LAST:[Guerline],FIRST:[Sofiya],PHONE:[phone]:[(   )    -],ADDRESS:[[address] [place] [address]]],PROVIDER:[TOKEN:[[id number]]]

## 2019-10-31 NOTE — DISCHARGE NOTE PROVIDER - NSDCCPCAREPLAN_GEN_ALL_CORE_FT
PRINCIPAL DISCHARGE DIAGNOSIS  Diagnosis: UGIB (upper gastrointestinal bleed)  Assessment and Plan of Treatment: There are 2 common types of GI Bleed, Upper GI Bleed and Lower GI Bleed.  Upper GI Bleed affects the esophagus, stomach, and first part of the small intestine. Lower GI Bleed affects the colon and rectum.  Upper GI Bleed signs and symptoms to notify your Health Care Provider are vomiting blood, or coffee ground vomitus, and bowel movements that look like black tar.  Lower GI Bleed signs and symptoms to notify your health care provider are bright red bloody bowel movements.   Take your medications as prescribed by your Gastroenterologist.  If you have had an Endoscopy or Colonoscopy, follow up with your Gastroenterologist for Pathology results.  Avoid NSAIDs unless your Health Care Provider tells you that it is ok (Aspirin, Ibuprofen, Advil, Motrin, Aleve).  Follow up with your Gastroenterologist within 1-2 weeks of discharge.  See your doctor or nurse right away if you: Vomit blood or something that looks like coffee grounds; Have a bowel movement that looks like tar or has blood in it; Feel weak, light-headed, or woozy; Have a racing heartbeat; Have severe belly pain; Turn much paler than normal. PRINCIPAL DISCHARGE DIAGNOSIS  Diagnosis: UGIB (upper gastrointestinal bleed)  Assessment and Plan of Treatment: There are 2 common types of GI Bleed, Upper GI Bleed and Lower GI Bleed.  Upper GI Bleed affects the esophagus, stomach, and first part of the small intestine. Lower GI Bleed affects the colon and rectum.  Upper GI Bleed signs and symptoms to notify your Health Care Provider are vomiting blood, or coffee ground vomitus, and bowel movements that look like black tar.  Lower GI Bleed signs and symptoms to notify your health care provider are bright red bloody bowel movements.   Take your medications as prescribed by your Gastroenterologist.  If you have had an Endoscopy or Colonoscopy, follow up with your Gastroenterologist for Pathology results.  Avoid NSAIDs unless your Health Care Provider tells you that it is ok (Aspirin, Ibuprofen, Advil, Motrin, Aleve).  Follow up with your Gastroenterologist within 1-2 weeks of discharge.  See your doctor or nurse right away if you: Vomit blood or something that looks like coffee grounds; Have a bowel movement that looks like tar or has blood in it; Feel weak, light-headed, or woozy; Have a racing heartbeat; Have severe belly pain; Turn much paler than normal.  Your GI bleed happened because you had a tear in your stomach lining from all the vomiting which has been intervened upon. Please follow up with your GI doctor on discharge. PRINCIPAL DISCHARGE DIAGNOSIS  Diagnosis: UGIB (upper gastrointestinal bleed)  Assessment and Plan of Treatment: There are 2 common types of GI Bleed, Upper GI Bleed and Lower GI Bleed.  Upper GI Bleed affects the esophagus, stomach, and first part of the small intestine. Lower GI Bleed affects the colon and rectum.  Upper GI Bleed signs and symptoms to notify your Health Care Provider are vomiting blood, or coffee ground vomitus, and bowel movements that look like black tar.  Lower GI Bleed signs and symptoms to notify your health care provider are bright red bloody bowel movements.   Take your medications as prescribed by your Gastroenterologist.  If you have had an Endoscopy or Colonoscopy, follow up with your Gastroenterologist for Pathology results.  Avoid NSAIDs unless your Health Care Provider tells you that it is ok (Aspirin, Ibuprofen, Advil, Motrin, Aleve).  Follow up with your Gastroenterologist within 1-2 weeks of discharge.  See your doctor or nurse right away if you: Vomit blood or something that looks like coffee grounds; Have a bowel movement that looks like tar or has blood in it; Feel weak, light-headed, or woozy; Have a racing heartbeat; Have severe belly pain; Turn much paler than normal.  Your GI bleed happened because you had a tear in your stomach lining from all the vomiting which has been intervened upon. Please follow up with your GI doctor on discharge.   Please START taking Pantoprazole 40 mg twice a day.         SECONDARY DISCHARGE DIAGNOSES  Diagnosis: Diabetes mellitus  Assessment and Plan of Treatment: Please START taking Lantus 20 untis at bedtime. Please CONTINUE taking Januvia, Glipizide, and Metformin. Please check your finger sticks at home. If your glucose level is persistently above 300 please call your primary care doctor.    Diagnosis: Hypertension  Assessment and Plan of Treatment: Your blood pressure medications have been in held due to lower blood pressures. Please STOP taking amlodipine, atenolol-chlorathalidone, Losartan. Please check your blood pressure at home. If your blood pressure is persistently higher than 150/90 please call your primary care doctor. PRINCIPAL DISCHARGE DIAGNOSIS  Diagnosis: UGIB (upper gastrointestinal bleed)  Assessment and Plan of Treatment: There are 2 common types of GI Bleed, Upper GI Bleed and Lower GI Bleed.  Upper GI Bleed affects the esophagus, stomach, and first part of the small intestine. Lower GI Bleed affects the colon and rectum.  Upper GI Bleed signs and symptoms to notify your Health Care Provider are vomiting blood, or coffee ground vomitus, and bowel movements that look like black tar.  Lower GI Bleed signs and symptoms to notify your health care provider are bright red bloody bowel movements.   Take your medications as prescribed by your Gastroenterologist.  If you have had an Endoscopy or Colonoscopy, follow up with your Gastroenterologist for Pathology results.  Avoid NSAIDs unless your Health Care Provider tells you that it is ok (Aspirin, Ibuprofen, Advil, Motrin, Aleve).  Follow up with your Gastroenterologist within 1-2 weeks of discharge.  See your doctor or nurse right away if you: Vomit blood or something that looks like coffee grounds; Have a bowel movement that looks like tar or has blood in it; Feel weak, light-headed, or woozy; Have a racing heartbeat; Have severe belly pain; Turn much paler than normal.  Your GI bleed happened because you had a tear in your stomach lining from all the vomiting which has been intervened upon. Please follow up with your GI doctor on discharge.   Please START taking Pantoprazole 40 mg twice a day.         SECONDARY DISCHARGE DIAGNOSES  Diagnosis: Diabetes mellitus  Assessment and Plan of Treatment: Please START taking Lantus 20 untis at bedtime. Please CONTINUE taking Januvia, Glipizide, and Metformin. Please check your finger sticks at home. If your glucose level is persistently above 300 please call your primary care doctor.    Diagnosis: Hypertension  Assessment and Plan of Treatment: Your blood pressure medications have been in held due to lower blood pressures. Please STOP taking atenolol-chlorathalidone, Losartan. Please CONTINUE to take amlodipine 10 mg daily. Please check your blood pressure at home. If your blood pressure is persistently higher than 150/90 please call your primary care doctor.

## 2019-10-31 NOTE — PROGRESS NOTE ADULT - PROBLEM SELECTOR PLAN 5
-awaiting med rec -c/w atorvastatin 40 -home amlodipine 10, atenolol/chlor 50/25, losartan 100 -home amlodipine 10, atenolol/chlor 50/25, losartan 100  -BP stable, will monitor

## 2019-11-01 LAB
ALBUMIN SERPL ELPH-MCNC: 3.6 G/DL — SIGNIFICANT CHANGE UP (ref 3.3–5)
ALP SERPL-CCNC: 86 U/L — SIGNIFICANT CHANGE UP (ref 40–120)
ALT FLD-CCNC: 26 U/L — SIGNIFICANT CHANGE UP (ref 4–33)
ANION GAP SERPL CALC-SCNC: 12 MMO/L — SIGNIFICANT CHANGE UP (ref 7–14)
AST SERPL-CCNC: 23 U/L — SIGNIFICANT CHANGE UP (ref 4–32)
BILIRUB SERPL-MCNC: 0.4 MG/DL — SIGNIFICANT CHANGE UP (ref 0.2–1.2)
BUN SERPL-MCNC: 9 MG/DL — SIGNIFICANT CHANGE UP (ref 7–23)
CALCIUM SERPL-MCNC: 9 MG/DL — SIGNIFICANT CHANGE UP (ref 8.4–10.5)
CHLORIDE SERPL-SCNC: 101 MMOL/L — SIGNIFICANT CHANGE UP (ref 98–107)
CO2 SERPL-SCNC: 26 MMOL/L — SIGNIFICANT CHANGE UP (ref 22–31)
CREAT SERPL-MCNC: 0.79 MG/DL — SIGNIFICANT CHANGE UP (ref 0.5–1.3)
GLUCOSE BLDC GLUCOMTR-MCNC: 201 MG/DL — HIGH (ref 70–99)
GLUCOSE BLDC GLUCOMTR-MCNC: 238 MG/DL — HIGH (ref 70–99)
GLUCOSE BLDC GLUCOMTR-MCNC: 242 MG/DL — HIGH (ref 70–99)
GLUCOSE BLDC GLUCOMTR-MCNC: 243 MG/DL — HIGH (ref 70–99)
GLUCOSE SERPL-MCNC: 214 MG/DL — HIGH (ref 70–99)
HCT VFR BLD CALC: 31.8 % — LOW (ref 34.5–45)
HGB BLD-MCNC: 10.2 G/DL — LOW (ref 11.5–15.5)
INR BLD: 1.05 — SIGNIFICANT CHANGE UP (ref 0.88–1.17)
MCHC RBC-ENTMCNC: 25.1 PG — LOW (ref 27–34)
MCHC RBC-ENTMCNC: 32.1 % — SIGNIFICANT CHANGE UP (ref 32–36)
MCV RBC AUTO: 78.1 FL — LOW (ref 80–100)
NRBC # FLD: 0 K/UL — SIGNIFICANT CHANGE UP (ref 0–0)
PLATELET # BLD AUTO: 212 K/UL — SIGNIFICANT CHANGE UP (ref 150–400)
PMV BLD: 10.5 FL — SIGNIFICANT CHANGE UP (ref 7–13)
POTASSIUM SERPL-MCNC: 4 MMOL/L — SIGNIFICANT CHANGE UP (ref 3.5–5.3)
POTASSIUM SERPL-SCNC: 4 MMOL/L — SIGNIFICANT CHANGE UP (ref 3.5–5.3)
PROT SERPL-MCNC: 7.3 G/DL — SIGNIFICANT CHANGE UP (ref 6–8.3)
PROTHROM AB SERPL-ACNC: 11.7 SEC — SIGNIFICANT CHANGE UP (ref 9.8–13.1)
RBC # BLD: 4.07 M/UL — SIGNIFICANT CHANGE UP (ref 3.8–5.2)
RBC # FLD: 14.5 % — SIGNIFICANT CHANGE UP (ref 10.3–14.5)
SODIUM SERPL-SCNC: 139 MMOL/L — SIGNIFICANT CHANGE UP (ref 135–145)
WBC # BLD: 9.74 K/UL — SIGNIFICANT CHANGE UP (ref 3.8–10.5)
WBC # FLD AUTO: 9.74 K/UL — SIGNIFICANT CHANGE UP (ref 3.8–10.5)

## 2019-11-01 PROCEDURE — 99233 SBSQ HOSP IP/OBS HIGH 50: CPT | Mod: GC

## 2019-11-01 PROCEDURE — 99232 SBSQ HOSP IP/OBS MODERATE 35: CPT | Mod: GC

## 2019-11-01 RX ORDER — PANTOPRAZOLE SODIUM 20 MG/1
40 TABLET, DELAYED RELEASE ORAL
Refills: 0 | Status: DISCONTINUED | OUTPATIENT
Start: 2019-11-01 | End: 2019-11-01

## 2019-11-01 RX ORDER — PANTOPRAZOLE SODIUM 20 MG/1
1 TABLET, DELAYED RELEASE ORAL
Qty: 28 | Refills: 0
Start: 2019-11-01 | End: 2019-11-14

## 2019-11-01 RX ORDER — AMLODIPINE BESYLATE 2.5 MG/1
1 TABLET ORAL
Qty: 0 | Refills: 0 | DISCHARGE

## 2019-11-01 RX ORDER — PANTOPRAZOLE SODIUM 20 MG/1
40 TABLET, DELAYED RELEASE ORAL
Refills: 0 | Status: DISCONTINUED | OUTPATIENT
Start: 2019-11-01 | End: 2019-11-02

## 2019-11-01 RX ORDER — INSULIN GLARGINE 100 [IU]/ML
50 INJECTION, SOLUTION SUBCUTANEOUS
Qty: 0 | Refills: 0 | DISCHARGE

## 2019-11-01 RX ORDER — LOSARTAN POTASSIUM 100 MG/1
1 TABLET, FILM COATED ORAL
Qty: 0 | Refills: 0 | DISCHARGE

## 2019-11-01 RX ORDER — PIPERACILLIN AND TAZOBACTAM 4; .5 G/20ML; G/20ML
3.38 INJECTION, POWDER, LYOPHILIZED, FOR SOLUTION INTRAVENOUS ONCE
Refills: 0 | Status: DISCONTINUED | OUTPATIENT
Start: 2019-11-01 | End: 2019-11-01

## 2019-11-01 RX ORDER — VANCOMYCIN HCL 1 G
VIAL (EA) INTRAVENOUS
Refills: 0 | Status: DISCONTINUED | OUTPATIENT
Start: 2019-11-01 | End: 2019-11-01

## 2019-11-01 RX ORDER — PIPERACILLIN AND TAZOBACTAM 4; .5 G/20ML; G/20ML
3.38 INJECTION, POWDER, LYOPHILIZED, FOR SOLUTION INTRAVENOUS EVERY 8 HOURS
Refills: 0 | Status: DISCONTINUED | OUTPATIENT
Start: 2019-11-01 | End: 2019-11-01

## 2019-11-01 RX ORDER — INSULIN GLARGINE 100 [IU]/ML
25 INJECTION, SOLUTION SUBCUTANEOUS
Qty: 5 | Refills: 0
Start: 2019-11-01

## 2019-11-01 RX ORDER — INSULIN GLARGINE 100 [IU]/ML
20 INJECTION, SOLUTION SUBCUTANEOUS AT BEDTIME
Refills: 0 | Status: DISCONTINUED | OUTPATIENT
Start: 2019-11-01 | End: 2019-11-02

## 2019-11-01 RX ADMIN — SODIUM CHLORIDE 75 MILLILITER(S): 9 INJECTION, SOLUTION INTRAVENOUS at 22:17

## 2019-11-01 RX ADMIN — PANTOPRAZOLE SODIUM 40 MILLIGRAM(S): 20 TABLET, DELAYED RELEASE ORAL at 17:34

## 2019-11-01 RX ADMIN — Medication 2: at 12:27

## 2019-11-01 RX ADMIN — Medication 2: at 08:42

## 2019-11-01 RX ADMIN — INSULIN GLARGINE 20 UNIT(S): 100 INJECTION, SOLUTION SUBCUTANEOUS at 22:18

## 2019-11-01 RX ADMIN — ATORVASTATIN CALCIUM 40 MILLIGRAM(S): 80 TABLET, FILM COATED ORAL at 22:18

## 2019-11-01 RX ADMIN — SODIUM CHLORIDE 75 MILLILITER(S): 9 INJECTION, SOLUTION INTRAVENOUS at 12:27

## 2019-11-01 RX ADMIN — Medication 2: at 17:32

## 2019-11-01 NOTE — PROGRESS NOTE ADULT - PROBLEM SELECTOR PLAN 3
PMD contacted, LBBB is not an acute finding therefore unlikely to be indicative of ischemic changes. Pt reportedly received full workup at [place] in 2017 - no significant findings.  -TTE ordered

## 2019-11-01 NOTE — PROGRESS NOTE ADULT - SUBJECTIVE AND OBJECTIVE BOX
Patient is a 63y old  Female who presents with a chief complaint of Hematemesis      INTERVAL HPI/OVERNIGHT EVENTS: No events. Pt denies hematemesis, N/V/D, BRBPR, dizziness, lightheadedness.       REVIEW OF SYSTEMS:  CONSTITUTIONAL: No fever, weight loss, or fatigue  EYES: No eye pain, visual disturbances, or discharge  ENMT:  No difficulty hearing, tinnitus, vertigo; No sinus or throat pain  NECK: No pain or stiffness  BREASTS: No pain, masses, or nipple discharge  RESPIRATORY: No cough, wheezing, chills or hemoptysis; No shortness of breath  CARDIOVASCULAR: No chest pain, palpitations, dizziness, or leg swelling  GASTROINTESTINAL: No abdominal or epigastric pain. No nausea, vomiting, or hematemesis; No diarrhea or constipation. No melena or hematochezia.  GENITOURINARY: No dysuria, frequency, hematuria, or incontinence  NEUROLOGICAL: No headaches, memory loss, loss of strength, numbness, or tremors  SKIN: No itching, burning, rashes, or lesions   LYMPH NODES: No enlarged glands  ENDOCRINE: No heat or cold intolerance; No hair loss  MUSCULOSKELETAL: No joint pain or swelling; No muscle, back, or extremity pain    FAMILY HISTORY:  No pertinent family history in first degree relatives    PHYSICAL EXAM:  GENERAL: NAD, well-groomed, well-developed  HEAD:  Atraumatic, Normocephalic  EYES: EOMI, PERRLA, conjunctiva and sclera clear  ENMT: No tonsillar erythema, exudates, or enlargement; Moist mucous membranes, Good dentition, No lesions  NECK: Supple, No JVD, Normal thyroid  NERVOUS SYSTEM:  Alert & Oriented X3, Good concentration; Motor Strength 5/5 B/L upper and lower extremities; DTRs 2+ intact and symmetric  CHEST/LUNG: Clear to percussion bilaterally; No rales, rhonchi, wheezing, or rubs  HEART: Regular rate and rhythm; No murmurs, rubs, or gallops  ABDOMEN: Soft, Nontender, Nondistended; Bowel sounds present  EXTREMITIES:  2+ Peripheral Pulses, No clubbing, cyanosis, or edema  LYMPH: No lymphadenopathy noted  SKIN: No rashes or lesions    Consultant(s) Notes Reviewed:  [x ] YES  [ ] NO  Care Discussed with Consultants/Other Providers [ x] YES  [ ] NO    MEDICATIONS  (STANDING):  atorvastatin 40 milliGRAM(s) Oral at bedtime  dextrose 5%. 1000 milliLiter(s) (50 mL/Hr) IV Continuous <Continuous>  dextrose 50% Injectable 12.5 Gram(s) IV Push once  dextrose 50% Injectable 25 Gram(s) IV Push once  dextrose 50% Injectable 25 Gram(s) IV Push once  insulin glargine Injectable (LANTUS) 13 Unit(s) SubCutaneous at bedtime  insulin lispro (HumaLOG) corrective regimen sliding scale   SubCutaneous three times a day before meals  insulin lispro (HumaLOG) corrective regimen sliding scale   SubCutaneous at bedtime  lactated ringers. 1000 milliLiter(s) (75 mL/Hr) IV Continuous <Continuous>  pantoprazole Infusion 8 mG/Hr (10 mL/Hr) IV Continuous <Continuous>    MEDICATIONS  (PRN):  dextrose 40% Gel 15 Gram(s) Oral once PRN Blood Glucose LESS THAN 70 milliGRAM(s)/deciliter  glucagon  Injectable 1 milliGRAM(s) IntraMuscular once PRN Glucose LESS THAN 70 milligrams/deciliter  ondansetron Injectable 4 milliGRAM(s) IV Push every 6 hours PRN Nausea and/or Vomiting Patient is a 63y old  Female who presents with a chief complaint of Hematemesis      INTERVAL HPI/OVERNIGHT EVENTS: No events. Pt denies hematemesis, N/V/D, BRBPR, dizziness, lightheadedness. Patient has not passed stools yet (last was 10/29) however is NPO and reports that she is passing flatus.      REVIEW OF SYSTEMS:  CONSTITUTIONAL: No fever, weight loss, or fatigue  EYES: No eye pain, visual disturbances, or discharge  ENMT:  No difficulty hearing, tinnitus, vertigo; No sinus or throat pain  NECK: No pain or stiffness  RESPIRATORY: No cough, wheezing, chills or hemoptysis; No shortness of breath  CARDIOVASCULAR: No chest pain, palpitations, dizziness, or leg swelling  GASTROINTESTINAL: +Intermittent mild epigastric pain. No nausea, vomiting, or hematemesis; No diarrhea or constipation. No melena or hematochezia.  GENITOURINARY: No dysuria, frequency, hematuria, or incontinence  NEUROLOGICAL: No headaches, memory loss, loss of strength, numbness, or tremors  SKIN: No itching, burning, rashes, or lesions   LYMPH NODES: No enlarged glands  ENDOCRINE: No heat or cold intolerance; No hair loss  MUSCULOSKELETAL: No joint pain or swelling; No muscle, back, or extremity pain    FAMILY HISTORY:  No pertinent family history in first degree relatives      MEDICATIONS  (STANDING):  atorvastatin 40 milliGRAM(s) Oral at bedtime  dextrose 5%. 1000 milliLiter(s) (50 mL/Hr) IV Continuous <Continuous>  dextrose 50% Injectable 12.5 Gram(s) IV Push once  dextrose 50% Injectable 25 Gram(s) IV Push once  dextrose 50% Injectable 25 Gram(s) IV Push once  insulin glargine Injectable (LANTUS) 13 Unit(s) SubCutaneous at bedtime  insulin lispro (HumaLOG) corrective regimen sliding scale   SubCutaneous three times a day before meals  insulin lispro (HumaLOG) corrective regimen sliding scale   SubCutaneous at bedtime  lactated ringers. 1000 milliLiter(s) (75 mL/Hr) IV Continuous <Continuous>  pantoprazole Infusion 8 mG/Hr (10 mL/Hr) IV Continuous <Continuous>    MEDICATIONS  (PRN):  dextrose 40% Gel 15 Gram(s) Oral once PRN Blood Glucose LESS THAN 70 milliGRAM(s)/deciliter  glucagon  Injectable 1 milliGRAM(s) IntraMuscular once PRN Glucose LESS THAN 70 milligrams/deciliter  ondansetron Injectable 4 milliGRAM(s) IV Push every 6 hours PRN Nausea and/or Vomiting      Vital Signs (24 Hrs):  T(C): 36.7 (11-01-19 @ 05:11), Max: 37.1 (10-31-19 @ 22:34)  HR: 72 (11-01-19 @ 05:11) (70 - 78)  BP: 135/68 (11-01-19 @ 05:11) (119/52 - 149/75)  RR: 18 (11-01-19 @ 05:11) (17 - 18)  SpO2: 100% (11-01-19 @ 05:11) (99% - 100%)    PHYSICAL EXAM:  GENERAL: NAD, well-developed  HEAD:  Atraumatic, Normocephalic  EYES: EOMI, PERRLA, conjunctiva and sclera clear  ENMT: No tonsillar erythema, exudates, or enlargement; +mildly dry mucous membranes, Good dentition, No lesions  NECK: Supple, No JVD, Normal thyroid  NERVOUS SYSTEM:  Alert & Oriented X3, Good concentration; Motor Strength 5/5 B/L upper and lower extremities; DTRs 2+ intact and symmetric  CHEST/LUNG: Clear to percussion bilaterally; No rales, rhonchi, wheezing, or rubs  HEART: Regular rate and rhythm, +2/6 systolic murmur. No rubs, gallops  ABDOMEN: Soft, Nontender, Nondistended; Bowel sounds present in all four quadrants  EXTREMITIES:  2+ Peripheral Pulses, No clubbing, cyanosis, or edema  LYMPH: No lymphadenopathy noted  SKIN: No rashes or lesions      LABS                        10.2   9.74  )-----------( 212      ( 01 Nov 2019 05:23 )             31.8       11-01    139  |  101  |  9   ----------------------------<  214<H>  4.0   |  26  |  0.79    Ca    9.0      01 Nov 2019 05:23  Phos  2.6     10-31  Mg     1.7     10-31    TPro  7.3  /  Alb  3.6  /  TBili  0.4  /  DBili  x   /  AST  23  /  ALT  26  /  AlkPhos  86  11-01          PT/INR - ( 01 Nov 2019 05:23 )   PT: 11.7 SEC;   INR: 1.05         CAPILLARY BLOOD GLUCOSE    POCT Blood Glucose.: 254 mg/dL (31 Oct 2019 22:16)  POCT Blood Glucose.: 268 mg/dL (31 Oct 2019 18:15)  POCT Blood Glucose.: 199 mg/dL (31 Oct 2019 17:02)  POCT Blood Glucose.: 286 mg/dL (31 Oct 2019 11:59) Patient is a 63y old  Female who presents with a chief complaint of Hematemesis      INTERVAL HPI/OVERNIGHT EVENTS: No events. Pt denies hematemesis, N/V/D, BRBPR, dizziness, lightheadedness. Patient has not passed stools yet (last was 10/29) however was NPO and reports that she is passing flatus. Pt is tolerating pureed foods well.      REVIEW OF SYSTEMS:  CONSTITUTIONAL: No fever, weight loss, or fatigue  EYES: No eye pain, visual disturbances, or discharge  ENMT:  No difficulty hearing, tinnitus, vertigo; No sinus or throat pain  NECK: No pain or stiffness  RESPIRATORY: No cough, wheezing, chills or hemoptysis; No shortness of breath  CARDIOVASCULAR: No chest pain, palpitations, dizziness, or leg swelling  GASTROINTESTINAL: +Intermittent mild epigastric pain. No nausea, vomiting, or hematemesis; No diarrhea or constipation. No melena or hematochezia.  GENITOURINARY: No dysuria, frequency, hematuria, or incontinence  NEUROLOGICAL: No headaches, memory loss, loss of strength, numbness, or tremors  SKIN: No itching, burning, rashes, or lesions   LYMPH NODES: No enlarged glands  ENDOCRINE: No heat or cold intolerance; No hair loss  MUSCULOSKELETAL: No joint pain or swelling; No muscle, back, or extremity pain    FAMILY HISTORY:  No pertinent family history in first degree relatives      MEDICATIONS  (STANDING):  atorvastatin 40 milliGRAM(s) Oral at bedtime  dextrose 5%. 1000 milliLiter(s) (50 mL/Hr) IV Continuous <Continuous>  dextrose 50% Injectable 12.5 Gram(s) IV Push once  dextrose 50% Injectable 25 Gram(s) IV Push once  dextrose 50% Injectable 25 Gram(s) IV Push once  insulin glargine Injectable (LANTUS) 13 Unit(s) SubCutaneous at bedtime  insulin lispro (HumaLOG) corrective regimen sliding scale   SubCutaneous three times a day before meals  insulin lispro (HumaLOG) corrective regimen sliding scale   SubCutaneous at bedtime  lactated ringers. 1000 milliLiter(s) (75 mL/Hr) IV Continuous <Continuous>  pantoprazole Infusion 8 mG/Hr (10 mL/Hr) IV Continuous <Continuous>    MEDICATIONS  (PRN):  dextrose 40% Gel 15 Gram(s) Oral once PRN Blood Glucose LESS THAN 70 milliGRAM(s)/deciliter  glucagon  Injectable 1 milliGRAM(s) IntraMuscular once PRN Glucose LESS THAN 70 milligrams/deciliter  ondansetron Injectable 4 milliGRAM(s) IV Push every 6 hours PRN Nausea and/or Vomiting      Vital Signs (24 Hrs):  T(C): 36.7 (11-01-19 @ 05:11), Max: 37.1 (10-31-19 @ 22:34)  HR: 72 (11-01-19 @ 05:11) (70 - 78)  BP: 135/68 (11-01-19 @ 05:11) (119/52 - 149/75)  RR: 18 (11-01-19 @ 05:11) (17 - 18)  SpO2: 100% (11-01-19 @ 05:11) (99% - 100%)    PHYSICAL EXAM:  GENERAL: NAD, well-developed  HEAD:  Atraumatic, Normocephalic  EYES: EOMI, PERRLA, conjunctiva and sclera clear  ENMT: No tonsillar erythema, exudates, or enlargement; +mildly dry mucous membranes, Good dentition, No lesions  NECK: Supple, No JVD, Normal thyroid  NERVOUS SYSTEM:  Alert & Oriented X3, Good concentration; Motor Strength 5/5 B/L upper and lower extremities; DTRs 2+ intact and symmetric  CHEST/LUNG: Clear to percussion bilaterally; No rales, rhonchi, wheezing, or rubs  HEART: Regular rate and rhythm, +2/6 systolic murmur. No rubs, gallops  ABDOMEN: Soft, Nontender, Nondistended; Bowel sounds present in all four quadrants  EXTREMITIES:  2+ Peripheral Pulses, No clubbing, cyanosis, or edema  LYMPH: No lymphadenopathy noted  SKIN: No rashes or lesions      LABS                        10.2   9.74  )-----------( 212      ( 01 Nov 2019 05:23 )             31.8       11-01    139  |  101  |  9   ----------------------------<  214<H>  4.0   |  26  |  0.79    Ca    9.0      01 Nov 2019 05:23  Phos  2.6     10-31  Mg     1.7     10-31    TPro  7.3  /  Alb  3.6  /  TBili  0.4  /  DBili  x   /  AST  23  /  ALT  26  /  AlkPhos  86  11-01          PT/INR - ( 01 Nov 2019 05:23 )   PT: 11.7 SEC;   INR: 1.05         CAPILLARY BLOOD GLUCOSE    POCT Blood Glucose.: 254 mg/dL (31 Oct 2019 22:16)  POCT Blood Glucose.: 268 mg/dL (31 Oct 2019 18:15)  POCT Blood Glucose.: 199 mg/dL (31 Oct 2019 17:02)  POCT Blood Glucose.: 286 mg/dL (31 Oct 2019 11:59) Patient is a 63y old  Female who presents with a chief complaint of Hematemesis      INTERVAL HPI/OVERNIGHT EVENTS: No events. Pt denies hematemesis, N/V/D, BRBPR, dizziness, lightheadedness. Patient has not passed stools yet (last was 10/29) however was NPO and reports that she is passing flatus. Pt is tolerating pureed foods well.      REVIEW OF SYSTEMS:  CONSTITUTIONAL: No fever, weight loss, or fatigue  EYES: No eye pain, visual disturbances, or discharge  ENMT:  No difficulty hearing, tinnitus, vertigo; No sinus or throat pain  NECK: No pain or stiffness  RESPIRATORY: No cough, wheezing, chills or hemoptysis; No shortness of breath  CARDIOVASCULAR: No chest pain, palpitations, dizziness, or leg swelling  GASTROINTESTINAL: +Intermittent mild epigastric pain. No nausea, vomiting, or hematemesis; No diarrhea or constipation. No melena or hematochezia.  GENITOURINARY: No dysuria, frequency, hematuria, or incontinence  NEUROLOGICAL: No headaches, memory loss, loss of strength, numbness, or tremors  SKIN: No itching, burning, rashes, or lesions   LYMPH NODES: No enlarged glands  ENDOCRINE: No heat or cold intolerance; No hair loss  MUSCULOSKELETAL: No joint pain or swelling; No muscle, back, or extremity pain    FAMILY HISTORY:  No pertinent family history in first degree relatives      MEDICATIONS  (STANDING):  atorvastatin 40 milliGRAM(s) Oral at bedtime  dextrose 5%. 1000 milliLiter(s) (50 mL/Hr) IV Continuous <Continuous>  dextrose 50% Injectable 12.5 Gram(s) IV Push once  dextrose 50% Injectable 25 Gram(s) IV Push once  dextrose 50% Injectable 25 Gram(s) IV Push once  insulin glargine Injectable (LANTUS) 13 Unit(s) SubCutaneous at bedtime  insulin lispro (HumaLOG) corrective regimen sliding scale   SubCutaneous three times a day before meals  insulin lispro (HumaLOG) corrective regimen sliding scale   SubCutaneous at bedtime  lactated ringers. 1000 milliLiter(s) (75 mL/Hr) IV Continuous <Continuous>  pantoprazole Infusion 8 mG/Hr (10 mL/Hr) IV Continuous <Continuous>    MEDICATIONS  (PRN):  dextrose 40% Gel 15 Gram(s) Oral once PRN Blood Glucose LESS THAN 70 milliGRAM(s)/deciliter  glucagon  Injectable 1 milliGRAM(s) IntraMuscular once PRN Glucose LESS THAN 70 milligrams/deciliter  ondansetron Injectable 4 milliGRAM(s) IV Push every 6 hours PRN Nausea and/or Vomiting      Vital Signs (24 Hrs):  T(C): 36.7 (11-01-19 @ 05:11), Max: 37.1 (10-31-19 @ 22:34)  HR: 72 (11-01-19 @ 05:11) (70 - 78)  BP: 135/68 (11-01-19 @ 05:11) (119/52 - 149/75)  RR: 18 (11-01-19 @ 05:11) (17 - 18)  SpO2: 100% (11-01-19 @ 05:11) (99% - 100%)    PHYSICAL EXAM:  GENERAL: NAD, well-developed  HEAD:  Atraumatic, Normocephalic  EYES: EOMI, PERRLA, conjunctiva and sclera clear  ENMT: No tonsillar erythema, exudates, or enlargement; +mildly dry mucous membranes, Good dentition, No lesions  NECK: Supple, No JVD, Normal thyroid  NERVOUS SYSTEM:  Alert & Oriented X3, Good concentration; Motor Strength 5/5 B/L upper and lower extremities; DTRs 2+ intact and symmetric  CHEST/LUNG: Clear to percussion bilaterally; No rales, rhonchi, wheezing, or rubs  HEART: Regular rate and rhythm, +2/6 systolic murmur. No rubs, gallops  ABDOMEN: Soft, Nontender, Nondistended; Bowel sounds present in all four quadrants  EXTREMITIES:  2+ Peripheral Pulses, No clubbing, cyanosis, or edema  LYMPH: No lymphadenopathy noted  SKIN: No rashes or lesions      LABS                        10.2   9.74  )-----------( 212      ( 01 Nov 2019 05:23 )             31.8       11-01    139  |  101  |  9   ----------------------------<  214<H>  4.0   |  26  |  0.79    Ca    9.0      01 Nov 2019 05:23  Phos  2.6     10-31  Mg     1.7     10-31    TPro  7.3  /  Alb  3.6  /  TBili  0.4  /  DBili  x   /  AST  23  /  ALT  26  /  AlkPhos  86  11-01          PT/INR - ( 01 Nov 2019 05:23 )   PT: 11.7 SEC;   INR: 1.05         CAPILLARY BLOOD GLUCOSE    POCT Blood Glucose.: 201 mg/dL (01 Nov 2019 08:31)  POCT Blood Glucose.: 254 mg/dL (31 Oct 2019 22:16)  POCT Blood Glucose.: 268 mg/dL (31 Oct 2019 18:15)  POCT Blood Glucose.: 199 mg/dL (31 Oct 2019 17:02)  POCT Blood Glucose.: 286 mg/dL (31 Oct 2019 11:59) Patient is a 63y old  Female who presents with a chief complaint of Hematemesis      INTERVAL HPI/OVERNIGHT EVENTS: No events. Pt denies hematemesis, N/V/D, BRBPR, dizziness, lightheadedness. Patient has not passed stools yet (last was 10/29) however was NPO and reports that she is passing flatus. Pt is tolerating pureed foods well.      REVIEW OF SYSTEMS:  CONSTITUTIONAL: No fever, weight loss, or fatigue  EYES: No eye pain, visual disturbances, or discharge  ENMT:  No difficulty hearing, tinnitus, vertigo; No sinus or throat pain  NECK: No pain or stiffness  RESPIRATORY: No cough, wheezing, chills or hemoptysis; No shortness of breath  CARDIOVASCULAR: No chest pain, palpitations, dizziness, or leg swelling  GASTROINTESTINAL: +Intermittent mild epigastric pain. No nausea, vomiting, or hematemesis; No diarrhea or constipation. No melena or hematochezia.  GENITOURINARY: No dysuria, frequency, hematuria, or incontinence  NEUROLOGICAL: No headaches, memory loss, loss of strength, numbness, or tremors  SKIN: No itching, burning, rashes, or lesions   LYMPH NODES: No enlarged glands  ENDOCRINE: No heat or cold intolerance; No hair loss  MUSCULOSKELETAL: No joint pain or swelling; No muscle, back, or extremity pain    FAMILY HISTORY:  No pertinent family history in first degree relatives      MEDICATIONS  (STANDING):  atorvastatin 40 milliGRAM(s) Oral at bedtime  dextrose 5%. 1000 milliLiter(s) (50 mL/Hr) IV Continuous <Continuous>  dextrose 50% Injectable 12.5 Gram(s) IV Push once  dextrose 50% Injectable 25 Gram(s) IV Push once  dextrose 50% Injectable 25 Gram(s) IV Push once  insulin glargine Injectable (LANTUS) 13 Unit(s) SubCutaneous at bedtime  insulin lispro (HumaLOG) corrective regimen sliding scale   SubCutaneous three times a day before meals  insulin lispro (HumaLOG) corrective regimen sliding scale   SubCutaneous at bedtime  lactated ringers. 1000 milliLiter(s) (75 mL/Hr) IV Continuous <Continuous>  pantoprazole Infusion 8 mG/Hr (10 mL/Hr) IV Continuous <Continuous>    MEDICATIONS  (PRN):  dextrose 40% Gel 15 Gram(s) Oral once PRN Blood Glucose LESS THAN 70 milliGRAM(s)/deciliter  glucagon  Injectable 1 milliGRAM(s) IntraMuscular once PRN Glucose LESS THAN 70 milligrams/deciliter  ondansetron Injectable 4 milliGRAM(s) IV Push every 6 hours PRN Nausea and/or Vomiting      Vital Signs (24 Hrs):  T(C): 36.7 (11-01-19 @ 05:11), Max: 37.1 (10-31-19 @ 22:34)  HR: 72 (11-01-19 @ 05:11) (70 - 78)  BP: 135/68 (11-01-19 @ 05:11) (119/52 - 149/75)  RR: 18 (11-01-19 @ 05:11) (17 - 18)  SpO2: 100% (11-01-19 @ 05:11) (99% - 100%)    PHYSICAL EXAM:  GENERAL: NAD, well-developed  HEAD:  Atraumatic, Normocephalic  EYES: EOMI, PERRLA, conjunctiva and sclera clear  ENMT: No tonsillar erythema, exudates, or enlargement; +mildly dry mucous membranes, Good dentition, No lesions  NECK: Supple, No JVD  NERVOUS SYSTEM:  Alert & Oriented X3, Good concentration  CHEST/LUNG: Clear to percussion bilaterally; No rales, rhonchi, wheezing, or rubs  HEART: Regular rate and rhythm, +2/6 systolic murmur. No rubs, gallops  ABDOMEN: Soft, Nontender, Nondistended; Bowel sounds present in all four quadrants  EXTREMITIES:  2+ Peripheral Pulses, No clubbing, cyanosis, or edema  LYMPH: No lymphadenopathy noted  SKIN: No rashes or lesions      LABS                        10.2   9.74  )-----------( 212      ( 01 Nov 2019 05:23 )             31.8       11-01    139  |  101  |  9   ----------------------------<  214<H>  4.0   |  26  |  0.79    Ca    9.0      01 Nov 2019 05:23  Phos  2.6     10-31  Mg     1.7     10-31    TPro  7.3  /  Alb  3.6  /  TBili  0.4  /  DBili  x   /  AST  23  /  ALT  26  /  AlkPhos  86  11-01          PT/INR - ( 01 Nov 2019 05:23 )   PT: 11.7 SEC;   INR: 1.05         CAPILLARY BLOOD GLUCOSE    POCT Blood Glucose.: 201 mg/dL (01 Nov 2019 08:31)  POCT Blood Glucose.: 254 mg/dL (31 Oct 2019 22:16)  POCT Blood Glucose.: 268 mg/dL (31 Oct 2019 18:15)  POCT Blood Glucose.: 199 mg/dL (31 Oct 2019 17:02)  POCT Blood Glucose.: 286 mg/dL (31 Oct 2019 11:59)

## 2019-11-01 NOTE — PROGRESS NOTE ADULT - SUBJECTIVE AND OBJECTIVE BOX
Chief Complaint:  Patient is a 63y old  Female who presents with a chief complaint of Hematemesis (01 Nov 2019 06:59)      Interval Events: tolerating po. No melena or hematochezia. No BM since procedure. Denies n/v, hematemesis    Allergies:  No Known Allergies      Hospital Medications:  atorvastatin 40 milliGRAM(s) Oral at bedtime  dextrose 40% Gel 15 Gram(s) Oral once PRN  dextrose 5%. 1000 milliLiter(s) IV Continuous <Continuous>  dextrose 50% Injectable 12.5 Gram(s) IV Push once  dextrose 50% Injectable 25 Gram(s) IV Push once  dextrose 50% Injectable 25 Gram(s) IV Push once  glucagon  Injectable 1 milliGRAM(s) IntraMuscular once PRN  insulin glargine Injectable (LANTUS) 13 Unit(s) SubCutaneous at bedtime  insulin lispro (HumaLOG) corrective regimen sliding scale   SubCutaneous three times a day before meals  insulin lispro (HumaLOG) corrective regimen sliding scale   SubCutaneous at bedtime  lactated ringers. 1000 milliLiter(s) IV Continuous <Continuous>  ondansetron Injectable 4 milliGRAM(s) IV Push every 6 hours PRN  pantoprazole Infusion 8 mG/Hr IV Continuous <Continuous>      PMHX/PSHX:  HLD (hyperlipidemia)  HTN (hypertension)  T2DM (type 2 diabetes mellitus)  No significant past surgical history      Family history:  No pertinent family history in first degree relatives      ROS:     General:  No wt loss, fevers, chills, night sweats, fatigue,   Eyes:  Good vision, no reported pain  ENT:  No sore throat, pain, runny nose, dysphagia  CV:  No pain, palpitations, hypo/hypertension  Resp:  No dyspnea, cough, tachypnea, wheezing  GI:  See HPI  :  No pain, bleeding, incontinence, nocturia  Muscle:  No pain, weakness  Neuro:  No weakness, tingling, memory problems  Psych:  No fatigue, insomnia, mood problems, depression  Endocrine:  No polyuria, polydipsia, cold/heat intolerance  Heme:  No petechiae, ecchymosis, easy bruisability  Skin:  No rash, edema      PHYSICAL EXAM:     Vital Signs:  Vital Signs Last 24 Hrs  T(C): 36.7 (01 Nov 2019 05:11), Max: 37.1 (31 Oct 2019 22:34)  T(F): 98.1 (01 Nov 2019 05:11), Max: 98.8 (31 Oct 2019 22:34)  HR: 72 (01 Nov 2019 05:11) (70 - 78)  BP: 135/68 (01 Nov 2019 05:11) (119/52 - 149/75)  BP(mean): --  RR: 18 (01 Nov 2019 05:11) (17 - 18)  SpO2: 100% (01 Nov 2019 05:11) (99% - 100%)  Daily     Daily     GENERAL:  appears comfortable, no acute distress  HEENT:  NC/AT,  conjunctivae clear, sclera -anicteric  CHEST:  CTABL, no increased effort  HEART:  Regular rhythm, S1, S2, no murmur/rub/S3/S4  ABDOMEN:  Soft, non-tender, non-distended, normoactive bowel sounds,  no masses ,no hepato-splenomegaly,   EXTREMITIES:  no cyanosis, clubbing or edema  SKIN:  No rash/erythema/ecchymoses/petechiae/wounds  NEURO:  Alert, oriented    LABS:                        10.2   9.74  )-----------( 212      ( 01 Nov 2019 05:23 )             31.8     11-01    139  |  101  |  9   ----------------------------<  214<H>  4.0   |  26  |  0.79    Ca    9.0      01 Nov 2019 05:23  Phos  2.6     10-31  Mg     1.7     10-31    TPro  7.3  /  Alb  3.6  /  TBili  0.4  /  DBili  x   /  AST  23  /  ALT  26  /  AlkPhos  86  11-01    LIVER FUNCTIONS - ( 01 Nov 2019 05:23 )  Alb: 3.6 g/dL / Pro: 7.3 g/dL / ALK PHOS: 86 u/L / ALT: 26 u/L / AST: 23 u/L / GGT: x           PT/INR - ( 01 Nov 2019 05:23 )   PT: 11.7 SEC;   INR: 1.05                  Imaging:    < from: Upper Endoscopy (10.30.19 @ 13:16) >  Findings:       A 10 mm non-bleeding Joanna-Mcbride tear with stigmata of recent bleeding        (adherent clot) was found at lower esophagus (GEJ). For hemostasis, two        hemostatic clips were successfully placed (MR conditional). There was no        bleeding at the end of the procedure.       A 7 mmnon-bleeding Joanna-Mcbride tear with no stigmata of recent        bleeding was found on the opposite wall of the lower esophagus. No        intervention performed.       Moderately severe esophagitis with no bleeding was found.       Patchy mild inflammation characterized by congestion (edema) and        erythema was found in the stomach.       The examined duodenum was normal.                                                                                   Impression:          - Joanna-Mcbride tear with adherent clot seen at the                        lower esophagus (GEJ). Clips (MR conditional) were                        placed.                       - Joanna-Mcbride tear.                       - Moderately severe esophagitis.           - Gastritis.                       - Normal examined duodenum.                       - No specimens collected.  Recommendation:      - Continue ongoing care in MICU.                       - Use a proton pump inhibitor IV BID.        - Clear liquid diet.    < end of copied text >

## 2019-11-01 NOTE — PROGRESS NOTE ADULT - ASSESSMENT
Impression:  # Hematemesis: secondary to tory hassan tear in setting of nausea and repeated vomiting after eating soup. Hgb stable  # T2DM  # Hypertension  # hyperlipidemia    Recommendation:  - pantoprazole 40mg po daily  - trend CBC,  - continue with pureed diet today and then advance diet tmro  - supportive care as per primary team Impression:  # Hematemesis: secondary to tory hassan tear in setting of nausea and repeated vomiting after eating soup. Hgb stable  # T2DM  # Hypertension  # hyperlipidemia    Recommendation:  - pantoprazole 40mg po BID  - trend CBC while inpatient  - continue with pureed diet while inpatient  - supportive care as per primary team Impression:  # Hematemesis: secondary to tory hassan tear in setting of nausea and repeated vomiting after eating soup. Hgb stable  # T2DM  # Hypertension  # hyperlipidemia    Recommendation:  - pantoprazole 40mg po BID  - trend CBC while inpatient  - continue with pureed diet while inpatient  - supportive care as per primary team  - no further planned GI interventions  - please call back if needed

## 2019-11-01 NOTE — PROGRESS NOTE ADULT - PROBLEM SELECTOR PLAN 2
Hb 10.2 (10.4 <-- 9.6); unlikely to be actively bleeding. Denies any symptoms of lightheadedness, dizziness, palpitations.  -Daily CBC  -Active type & screen  -Consider transfusion if Hb<7 or drop>2

## 2019-11-01 NOTE — PROGRESS NOTE ADULT - ASSESSMENT
63 year old female with PMH DM, HTN, HLD presented with n/v x2d, hematemesis x 1 day, found to have Joanna-Mcbride tear on upper endoscopy.

## 2019-11-01 NOTE — PROGRESS NOTE ADULT - PROBLEM SELECTOR PLAN 5
BP this am was 135/68. Home meds include amlodipine 10, atenolol/chlor 50/25, losartan 100  -Hold home meds  -Continue to monitor

## 2019-11-01 NOTE — PROGRESS NOTE ADULT - ATTENDING COMMENTS
63 y.o. Female w/ hx HTN, DM2, hyperlipidemia, chronic lower back pain p/w N/V after eating soup which progressed to hematemesis and acute blood loss anemia. She had EGD showing Joanna hassan tear which was clipped, gastritis and esophagitis. Patient is s/p 1 Unit PRBCs with stable H/H. Monitoring H/H. LBBB identified as old so no need for TTE. Possible d/c tomorrow if tolerating PO and no signs of bleeding.

## 2019-11-01 NOTE — PROGRESS NOTE ADULT - PROBLEM SELECTOR PLAN 4
-Hold home meds (metformin, januvia, glipizide)  -13U glargine at bedtime, titrate up as needed (home regimen is 50U at bedtime)  -ISS pre-meal Last A1c 10.8% (10/29), FS this am 201  -Hold home meds (metformin, januvia, glipizide)  -13U glargine at bedtime, titrate up as needed (home regimen is 50U at bedtime)  -ISS pre-meal Last A1c 10.8% (10/29), FS this am 201  -Hold home meds (metformin, januvia, glipizide)  -20U glargine at bedtime, titrate up as needed (home regimen is 40U in am)  -ISS pre-meal

## 2019-11-01 NOTE — PROGRESS NOTE ADULT - PROBLEM SELECTOR PLAN 1
-Pt is s/p endoscopy (10/30) - Joanna-Mcbride tear, clip applied, and esophagitis found  -C/w Protonix  -Pt is now having liquids, can advance diet to solids as tolerated  -Zofran prn Patient is currently stable  -Pt is s/p endoscopy (10/30) - Joanna-Mcbride tear, clip applied, and esophagitis found  -C/w Protonix  -Pt is now having liquids, can advance diet to solids as tolerated  -Zofran prn Patient is currently stable  -Pt is s/p endoscopy (10/30) - Joanna-Mcbride tear, clip applied, and esophagitis found  -C/w Protonix  -C/w pureed diet until tomorrow, can then reassess  -Zofran prn  -Appreciate GI recs Patient is currently stable  -Pt is s/p endoscopy (10/30) - Joanna-Mcbride tear, clip applied, and esophagitis found  -C/w Protonix bid. Pt to be d/c on it x1mos  -C/w pureed diet until tomorrow, can then reassess  -Zofran prn  -Appreciate GI recs

## 2019-11-02 VITALS
DIASTOLIC BLOOD PRESSURE: 62 MMHG | OXYGEN SATURATION: 100 % | HEART RATE: 81 BPM | SYSTOLIC BLOOD PRESSURE: 127 MMHG | RESPIRATION RATE: 18 BRPM | TEMPERATURE: 98 F

## 2019-11-02 LAB
ALBUMIN SERPL ELPH-MCNC: 3.8 G/DL — SIGNIFICANT CHANGE UP (ref 3.3–5)
ALP SERPL-CCNC: 92 U/L — SIGNIFICANT CHANGE UP (ref 40–120)
ALT FLD-CCNC: 28 U/L — SIGNIFICANT CHANGE UP (ref 4–33)
ANION GAP SERPL CALC-SCNC: 14 MMO/L — SIGNIFICANT CHANGE UP (ref 7–14)
AST SERPL-CCNC: 26 U/L — SIGNIFICANT CHANGE UP (ref 4–32)
BILIRUB SERPL-MCNC: 0.4 MG/DL — SIGNIFICANT CHANGE UP (ref 0.2–1.2)
BUN SERPL-MCNC: 9 MG/DL — SIGNIFICANT CHANGE UP (ref 7–23)
CALCIUM SERPL-MCNC: 9.4 MG/DL — SIGNIFICANT CHANGE UP (ref 8.4–10.5)
CHLORIDE SERPL-SCNC: 97 MMOL/L — LOW (ref 98–107)
CO2 SERPL-SCNC: 27 MMOL/L — SIGNIFICANT CHANGE UP (ref 22–31)
CREAT SERPL-MCNC: 0.79 MG/DL — SIGNIFICANT CHANGE UP (ref 0.5–1.3)
GLUCOSE BLDC GLUCOMTR-MCNC: 195 MG/DL — HIGH (ref 70–99)
GLUCOSE BLDC GLUCOMTR-MCNC: 261 MG/DL — HIGH (ref 70–99)
GLUCOSE SERPL-MCNC: 253 MG/DL — HIGH (ref 70–99)
HCT VFR BLD CALC: 33.4 % — LOW (ref 34.5–45)
HGB BLD-MCNC: 10.3 G/DL — LOW (ref 11.5–15.5)
INR BLD: 1.03 — SIGNIFICANT CHANGE UP (ref 0.88–1.17)
MCHC RBC-ENTMCNC: 24.7 PG — LOW (ref 27–34)
MCHC RBC-ENTMCNC: 30.8 % — LOW (ref 32–36)
MCV RBC AUTO: 80.1 FL — SIGNIFICANT CHANGE UP (ref 80–100)
NRBC # FLD: 0 K/UL — SIGNIFICANT CHANGE UP (ref 0–0)
PLATELET # BLD AUTO: 218 K/UL — SIGNIFICANT CHANGE UP (ref 150–400)
PMV BLD: 11.7 FL — SIGNIFICANT CHANGE UP (ref 7–13)
POTASSIUM SERPL-MCNC: 3.5 MMOL/L — SIGNIFICANT CHANGE UP (ref 3.5–5.3)
POTASSIUM SERPL-SCNC: 3.5 MMOL/L — SIGNIFICANT CHANGE UP (ref 3.5–5.3)
PROT SERPL-MCNC: 7.4 G/DL — SIGNIFICANT CHANGE UP (ref 6–8.3)
PROTHROM AB SERPL-ACNC: 11.7 SEC — SIGNIFICANT CHANGE UP (ref 9.8–13.1)
RBC # BLD: 4.17 M/UL — SIGNIFICANT CHANGE UP (ref 3.8–5.2)
RBC # FLD: 14.3 % — SIGNIFICANT CHANGE UP (ref 10.3–14.5)
SODIUM SERPL-SCNC: 138 MMOL/L — SIGNIFICANT CHANGE UP (ref 135–145)
WBC # BLD: 8.14 K/UL — SIGNIFICANT CHANGE UP (ref 3.8–10.5)
WBC # FLD AUTO: 8.14 K/UL — SIGNIFICANT CHANGE UP (ref 3.8–10.5)

## 2019-11-02 PROCEDURE — 99239 HOSP IP/OBS DSCHRG MGMT >30: CPT | Mod: GC

## 2019-11-02 RX ORDER — INSULIN GLARGINE 100 [IU]/ML
20 INJECTION, SOLUTION SUBCUTANEOUS
Qty: 5 | Refills: 0
Start: 2019-11-02 | End: 2019-12-01

## 2019-11-02 RX ORDER — ATORVASTATIN CALCIUM 80 MG/1
1 TABLET, FILM COATED ORAL
Qty: 0 | Refills: 0 | DISCHARGE

## 2019-11-02 RX ORDER — AMLODIPINE BESYLATE 2.5 MG/1
1 TABLET ORAL
Qty: 30 | Refills: 0
Start: 2019-11-02 | End: 2019-12-01

## 2019-11-02 RX ORDER — PANTOPRAZOLE SODIUM 20 MG/1
1 TABLET, DELAYED RELEASE ORAL
Qty: 60 | Refills: 0
Start: 2019-11-02 | End: 2019-12-01

## 2019-11-02 RX ORDER — SITAGLIPTIN 50 MG/1
1 TABLET, FILM COATED ORAL
Qty: 0 | Refills: 0 | DISCHARGE

## 2019-11-02 RX ORDER — METFORMIN HYDROCHLORIDE 850 MG/1
1 TABLET ORAL
Qty: 60 | Refills: 0
Start: 2019-11-02 | End: 2019-12-01

## 2019-11-02 RX ORDER — SITAGLIPTIN 50 MG/1
1 TABLET, FILM COATED ORAL
Qty: 30 | Refills: 0
Start: 2019-11-02 | End: 2019-12-01

## 2019-11-02 RX ORDER — ATENOLOL AND CHLORTHALIDONE 50; 25 MG/1; MG/1
1 TABLET ORAL
Qty: 0 | Refills: 0 | DISCHARGE

## 2019-11-02 RX ORDER — METFORMIN HYDROCHLORIDE 850 MG/1
1 TABLET ORAL
Qty: 0 | Refills: 0 | DISCHARGE

## 2019-11-02 RX ORDER — INSULIN GLARGINE 100 [IU]/ML
20 INJECTION, SOLUTION SUBCUTANEOUS
Qty: 0 | Refills: 0 | DISCHARGE
Start: 2019-11-02

## 2019-11-02 RX ORDER — ATORVASTATIN CALCIUM 80 MG/1
1 TABLET, FILM COATED ORAL
Qty: 30 | Refills: 0
Start: 2019-11-02 | End: 2019-12-01

## 2019-11-02 RX ADMIN — PANTOPRAZOLE SODIUM 40 MILLIGRAM(S): 20 TABLET, DELAYED RELEASE ORAL at 05:10

## 2019-11-02 RX ADMIN — Medication 3: at 12:08

## 2019-11-02 RX ADMIN — Medication 1: at 08:24

## 2019-11-02 NOTE — PROGRESS NOTE ADULT - ASSESSMENT
63F with PMH DM, HTN, HLD presented with n/v x2d, hematemesis x 1 day, found to have Joanna-Mcbride tear on upper endoscopy, s/p banding, 1 unit PRBC and 1 uni PLT, d/c planning

## 2019-11-02 NOTE — PROGRESS NOTE ADULT - PROBLEM SELECTOR PLAN 5
BP this am was 135/68. Home meds include amlodipine 10, atenolol/chlor 50/25, losartan 100  -Hold home meds  -Continue to monitor BP this am was 135/68. Home meds include amlodipine 10, atenolol/chlor 50/25, losartan 100  -Hold home meds, will restart one upon discharge given three four anti-HTN   -Continue to monitor

## 2019-11-02 NOTE — PROGRESS NOTE ADULT - PROBLEM SELECTOR PLAN 4
Last A1c 10.8% (10/29), FS this am 201  -Hold home meds (metformin, januvia, glipizide)  -20U glargine at bedtime, titrate up as needed (home regimen is 40U in am)  -ISS pre-meal

## 2019-11-02 NOTE — PROGRESS NOTE ADULT - PROBLEM SELECTOR PLAN 3
PMD contacted, LBBB is not an acute finding therefore unlikely to be indicative of ischemic changes. Pt reportedly received full workup at [facility] in 2017 - no significant findings.  TTE as outpatient

## 2019-11-02 NOTE — PROGRESS NOTE ADULT - PROBLEM SELECTOR PROBLEM 1
UGIB (upper gastrointestinal bleed)

## 2019-11-02 NOTE — PROGRESS NOTE ADULT - ATTENDING COMMENTS
Patient seen and examined, chart and labs reviewed. Case discussed with house staff.    63F with PMH T2DM, HTN, presented with n/v x2d, hematemesis x 1 day, found to have Joanna-Mcbride tear on upper endoscopy, s/p banding, 1 unit PRBC and 1 unit PLT. Patient with stable H/H and no further inpatient GI work-up planned. Patient stable for discharge home today. Instructed patient to continue taking Lantus 20U (inpatient dose) and resume home oral medications. Also instructed patient to restart amlodipine upon discharge but follow-up with PCP prior to restarting addition anti-hypertensives and ASA. Patient agreeable with plan.     34 minutes spent on discharge planning Patient seen and examined, chart and labs reviewed. Case discussed with house staff.    63F with PMH T2DM, HTN, presented with n/v x2d, hematemesis x 1 day, found to have Joanna-Mcbride tear on upper endoscopy, s/p banding, 1 unit PRBC and 1 unit PLT. Patient with stable H/H and no further inpatient GI work-up planned. Patient stable for discharge home today. Instructed patient to continue taking Lantus 20U (inpatient dose) and resume home oral medications. Also instructed patient to restart amlodipine upon discharge but follow-up with PCP prior to restarting addition anti-hypertensives and ASA. Patient agreeable with plan and will follow-up w/ PCP.     34 minutes spent on discharge planning

## 2019-11-02 NOTE — PROGRESS NOTE ADULT - SUBJECTIVE AND OBJECTIVE BOX
Patient is a 64y old  Female who presents with a chief complaint of Hematemesis (01 Nov 2019 08:41)      SUBJECTIVE / OVERNIGHT EVENTS: No overnight events. No complaints this AM. Patient denies CP, SOB.    MEDICATIONS  (STANDING):  atorvastatin 40 milliGRAM(s) Oral at bedtime  dextrose 5%. 1000 milliLiter(s) (50 mL/Hr) IV Continuous <Continuous>  dextrose 50% Injectable 12.5 Gram(s) IV Push once  dextrose 50% Injectable 25 Gram(s) IV Push once  dextrose 50% Injectable 25 Gram(s) IV Push once  insulin glargine Injectable (LANTUS) 20 Unit(s) SubCutaneous at bedtime  insulin lispro (HumaLOG) corrective regimen sliding scale   SubCutaneous three times a day before meals  insulin lispro (HumaLOG) corrective regimen sliding scale   SubCutaneous at bedtime  lactated ringers. 1000 milliLiter(s) (75 mL/Hr) IV Continuous <Continuous>  pantoprazole    Tablet 40 milliGRAM(s) Oral two times a day    MEDICATIONS  (PRN):  dextrose 40% Gel 15 Gram(s) Oral once PRN Blood Glucose LESS THAN 70 milliGRAM(s)/deciliter  glucagon  Injectable 1 milliGRAM(s) IntraMuscular once PRN Glucose LESS THAN 70 milligrams/deciliter  ondansetron Injectable 4 milliGRAM(s) IV Push every 6 hours PRN Nausea and/or Vomiting      T(C): 36.7 (11-02-19 @ 05:00), Max: 36.9 (11-01-19 @ 12:37)  HR: 66 (11-02-19 @ 05:00) (66 - 75)  BP: 115/58 (11-02-19 @ 05:00) (115/58 - 144/79)  RR: 18 (11-02-19 @ 05:00) (17 - 18)  SpO2: 100% (11-02-19 @ 05:00) (99% - 100%)    PHYSICAL EXAM  GENERAL: NAD, well-developed  NEURO: AO x3, PERRLA, EOMI, motor strength in tact in 4/4 extremities, sensation in tact  HEAD:  Atraumatic, Normocephalic  EYES: conjunctiva and sclera clear  NECK: Supple, No JVD, no lymphadenopathy, no thyromegaly  CHEST/LUNG: Clear to auscultation bilaterally; No wheezes, rales or rhonchi  HEART: Regular rate and rhythm; No murmurs, rubs, or gallops  ABDOMEN: Soft, Nontender, Nondistended; Bowel sounds present, no masses.  EXTREMITIES:  2+ Peripheral Pulses, No clubbing, cyanosis, or edema  SKIN: Warm, dry, in tact, no rashes or lesions  PSYCH: affect appropriate    LABS:                        10.2   9.74  )-----------( 212      ( 01 Nov 2019 05:23 )             31.8     11-01    139  |  101  |  9   ----------------------------<  214<H>  4.0   |  26  |  0.79    Ca    9.0      01 Nov 2019 05:23    TPro  7.3  /  Alb  3.6  /  TBili  0.4  /  DBili  x   /  AST  23  /  ALT  26  /  AlkPhos  86  11-01    PT/INR - ( 01 Nov 2019 05:23 )   PT: 11.7 SEC;   INR: 1.05                  I&O's Summary      Tosha Gruber MD  Internal Medicine Resident, PGY1  Pager: [phone] / [phone] Patient is a 64y old  Female who presents with a chief complaint of Hematemesis (01 Nov 2019 08:41)      SUBJECTIVE / OVERNIGHT EVENTS: No overnight events. Patient sitting in chair. reports she feels well. Denies melena or hematemesis. No complaints this AM. Patient denies CP, SOB.    MEDICATIONS  (STANDING):  atorvastatin 40 milliGRAM(s) Oral at bedtime  dextrose 5%. 1000 milliLiter(s) (50 mL/Hr) IV Continuous <Continuous>  dextrose 50% Injectable 12.5 Gram(s) IV Push once  dextrose 50% Injectable 25 Gram(s) IV Push once  dextrose 50% Injectable 25 Gram(s) IV Push once  insulin glargine Injectable (LANTUS) 20 Unit(s) SubCutaneous at bedtime  insulin lispro (HumaLOG) corrective regimen sliding scale   SubCutaneous three times a day before meals  insulin lispro (HumaLOG) corrective regimen sliding scale   SubCutaneous at bedtime  lactated ringers. 1000 milliLiter(s) (75 mL/Hr) IV Continuous <Continuous>  pantoprazole    Tablet 40 milliGRAM(s) Oral two times a day    MEDICATIONS  (PRN):  dextrose 40% Gel 15 Gram(s) Oral once PRN Blood Glucose LESS THAN 70 milliGRAM(s)/deciliter  glucagon  Injectable 1 milliGRAM(s) IntraMuscular once PRN Glucose LESS THAN 70 milligrams/deciliter  ondansetron Injectable 4 milliGRAM(s) IV Push every 6 hours PRN Nausea and/or Vomiting      T(C): 36.7 (11-02-19 @ 05:00), Max: 36.9 (11-01-19 @ 12:37)  HR: 66 (11-02-19 @ 05:00) (66 - 75)  BP: 115/58 (11-02-19 @ 05:00) (115/58 - 144/79)  RR: 18 (11-02-19 @ 05:00) (17 - 18)  SpO2: 100% (11-02-19 @ 05:00) (99% - 100%)    PHYSICAL EXAM  GENERAL: frail elderly female NAD, well-developed  NEURO: AO x3  HEAD:  Atraumatic, Normocephalic  EYES: conjunctiva and sclera clear  NECK: Supple  CHEST/LUNG: Clear to auscultation bilaterally; No wheezes, rales or rhonchi  HEART: Regular rate and rhythm; No murmurs, rubs, or gallops  ABDOMEN: Soft, Nontender, Nondistended; Bowel sounds present, no masses.  EXTREMITIES:  2+ Peripheral Pulses, No clubbing, cyanosis, or edema  SKIN: Warm, dry, in tact, no rashes or lesions  PSYCH: affect appropriate    LABS:                        10.2   9.74  )-----------( 212      ( 01 Nov 2019 05:23 )             31.8     11-01    139  |  101  |  9   ----------------------------<  214<H>  4.0   |  26  |  0.79    Ca    9.0      01 Nov 2019 05:23    TPro  7.3  /  Alb  3.6  /  TBili  0.4  /  DBili  x   /  AST  23  /  ALT  26  /  AlkPhos  86  11-01    PT/INR - ( 01 Nov 2019 05:23 )   PT: 11.7 SEC;   INR: 1.05                  I&O's Summary      Tosha Gruber MD  Internal Medicine Resident, PGY3  Pager: [phone] / [phone]

## 2019-11-02 NOTE — DISCHARGE NOTE NURSING/CASE MANAGEMENT/SOCIAL WORK - PATIENT PORTAL LINK FT
You can access the FollowMyHealth Patient Portal offered by [facility] by registering at the following website: [email or web address]. By joining [email or web address] FollowMyHealth portal, you will also be able to view your health information using other applications (apps) compatible with our system.

## 2019-11-02 NOTE — PROGRESS NOTE ADULT - PROBLEM SELECTOR PLAN 1
Patient is currently stable  -Pt is s/p endoscopy (10/30) - Joanna-Mcbride tear, clip applied, and esophagitis found  -C/w Protonix bid. Pt to be d/c on it x1mos  -Tolerating regular diet   -Zofran prn  -Appreciate GI recs

## 2019-11-13 ENCOUNTER — INBOUND DOCUMENT (OUTPATIENT)
Age: 64
End: 2019-11-13

## 2019-11-13 PROBLEM — Z00.00 ENCOUNTER FOR PREVENTIVE HEALTH EXAMINATION: Status: ACTIVE | Noted: 2019-11-13

## 2025-05-05 NOTE — PATIENT PROFILE ADULT - PRIMARY ROLES/RESPONSIBILITIES
Pre-operative instructions, medication directives and pain scales reviewed with patient. All questions the patient had were answered. Re-assurance about surgical procedure and day of surgery routine given as needed, patient verbalized understanding of the pre-op instructions.  Patient instructed to report to [facility] for surgery.  
wage earner, full time